# Patient Record
Sex: FEMALE | Race: WHITE | NOT HISPANIC OR LATINO | Employment: UNEMPLOYED | URBAN - METROPOLITAN AREA
[De-identification: names, ages, dates, MRNs, and addresses within clinical notes are randomized per-mention and may not be internally consistent; named-entity substitution may affect disease eponyms.]

---

## 2017-03-08 ENCOUNTER — HOSPITAL ENCOUNTER (EMERGENCY)
Facility: HOSPITAL | Age: 32
Discharge: HOME | End: 2017-03-08
Attending: EMERGENCY MEDICINE

## 2017-03-08 VITALS
TEMPERATURE: 97 F | HEART RATE: 69 BPM | HEIGHT: 66 IN | SYSTOLIC BLOOD PRESSURE: 132 MMHG | OXYGEN SATURATION: 97 % | BODY MASS INDEX: 36.96 KG/M2 | RESPIRATION RATE: 16 BRPM | WEIGHT: 230 LBS | DIASTOLIC BLOOD PRESSURE: 64 MMHG

## 2017-03-08 DIAGNOSIS — R10.11 RUQ ABDOMINAL PAIN: Primary | ICD-10-CM

## 2017-03-08 LAB
ALBUMIN SERPL BCP-MCNC: 3.3 G/DL
ALP SERPL-CCNC: 55 U/L
ALT SERPL W/O P-5'-P-CCNC: 13 U/L
ANION GAP SERPL CALC-SCNC: 6 MMOL/L
AST SERPL-CCNC: 9 U/L
B-HCG UR QL: NEGATIVE
BACTERIA #/AREA URNS HPF: NORMAL /HPF
BASOPHILS # BLD AUTO: 0.1 K/UL
BASOPHILS NFR BLD: 0.6 %
BILIRUB SERPL-MCNC: 0.1 MG/DL
BILIRUB UR QL STRIP: ABNORMAL
BUN SERPL-MCNC: 10 MG/DL
CALCIUM SERPL-MCNC: 9.1 MG/DL
CHLORIDE SERPL-SCNC: 105 MMOL/L
CLARITY UR: CLEAR
CO2 SERPL-SCNC: 28 MMOL/L
COLOR UR: YELLOW
CREAT SERPL-MCNC: 0.8 MG/DL
CTP QC/QA: YES
DIFFERENTIAL METHOD: NORMAL
EOSINOPHIL # BLD AUTO: 0.2 K/UL
EOSINOPHIL NFR BLD: 1.8 %
ERYTHROCYTE [DISTWIDTH] IN BLOOD BY AUTOMATED COUNT: 13.7 %
EST. GFR  (AFRICAN AMERICAN): >60 ML/MIN/1.73 M^2
EST. GFR  (NON AFRICAN AMERICAN): >60 ML/MIN/1.73 M^2
GLUCOSE SERPL-MCNC: 100 MG/DL
GLUCOSE UR QL STRIP: NEGATIVE
HCT VFR BLD AUTO: 38.5 %
HGB BLD-MCNC: 12.7 G/DL
HGB UR QL STRIP: ABNORMAL
KETONES UR QL STRIP: ABNORMAL
LEUKOCYTE ESTERASE UR QL STRIP: NEGATIVE
LIPASE SERPL-CCNC: 15 U/L
LYMPHOCYTES # BLD AUTO: 4.2 K/UL
LYMPHOCYTES NFR BLD: 33 %
MCH RBC QN AUTO: 29.6 PG
MCHC RBC AUTO-ENTMCNC: 33 %
MCV RBC AUTO: 90 FL
MICROSCOPIC COMMENT: NORMAL
MONOCYTES # BLD AUTO: 0.9 K/UL
MONOCYTES NFR BLD: 6.7 %
NEUTROPHILS # BLD AUTO: 7.4 K/UL
NEUTROPHILS NFR BLD: 57.9 %
NITRITE UR QL STRIP: NEGATIVE
PH UR STRIP: 6 [PH] (ref 5–8)
PLATELET # BLD AUTO: 260 K/UL
PMV BLD AUTO: 10.6 FL
POTASSIUM SERPL-SCNC: 4.4 MMOL/L
PROT SERPL-MCNC: 6.8 G/DL
PROT UR QL STRIP: NEGATIVE
RBC # BLD AUTO: 4.28 M/UL
RBC #/AREA URNS HPF: 3 /HPF (ref 0–4)
SODIUM SERPL-SCNC: 139 MMOL/L
SP GR UR STRIP: 1.02 (ref 1–1.03)
SQUAMOUS #/AREA URNS HPF: 7 /HPF
URN SPEC COLLECT METH UR: ABNORMAL
UROBILINOGEN UR STRIP-ACNC: NEGATIVE EU/DL
WBC # BLD AUTO: 12.7 K/UL
WBC #/AREA URNS HPF: 1 /HPF (ref 0–5)

## 2017-03-08 PROCEDURE — 85025 COMPLETE CBC W/AUTO DIFF WBC: CPT

## 2017-03-08 PROCEDURE — 63600175 PHARM REV CODE 636 W HCPCS: Performed by: EMERGENCY MEDICINE

## 2017-03-08 PROCEDURE — 81000 URINALYSIS NONAUTO W/SCOPE: CPT

## 2017-03-08 PROCEDURE — 96376 TX/PRO/DX INJ SAME DRUG ADON: CPT

## 2017-03-08 PROCEDURE — 80053 COMPREHEN METABOLIC PANEL: CPT

## 2017-03-08 PROCEDURE — 81025 URINE PREGNANCY TEST: CPT | Performed by: EMERGENCY MEDICINE

## 2017-03-08 PROCEDURE — 83690 ASSAY OF LIPASE: CPT

## 2017-03-08 PROCEDURE — 99284 EMERGENCY DEPT VISIT MOD MDM: CPT | Mod: 25

## 2017-03-08 PROCEDURE — 25000003 PHARM REV CODE 250: Performed by: EMERGENCY MEDICINE

## 2017-03-08 PROCEDURE — 96374 THER/PROPH/DIAG INJ IV PUSH: CPT

## 2017-03-08 PROCEDURE — 36415 COLL VENOUS BLD VENIPUNCTURE: CPT

## 2017-03-08 PROCEDURE — 96375 TX/PRO/DX INJ NEW DRUG ADDON: CPT

## 2017-03-08 PROCEDURE — 96361 HYDRATE IV INFUSION ADD-ON: CPT

## 2017-03-08 RX ORDER — HYDROCODONE BITARTRATE AND ACETAMINOPHEN 5; 325 MG/1; MG/1
1 TABLET ORAL EVERY 6 HOURS PRN
Qty: 18 TABLET | Refills: 0 | Status: SHIPPED | OUTPATIENT
Start: 2017-03-08 | End: 2020-07-16 | Stop reason: ALTCHOICE

## 2017-03-08 RX ORDER — ONDANSETRON 4 MG/1
4 TABLET, ORALLY DISINTEGRATING ORAL EVERY 8 HOURS PRN
Qty: 12 TABLET | Refills: 0 | Status: SHIPPED | OUTPATIENT
Start: 2017-03-08 | End: 2020-07-16 | Stop reason: ALTCHOICE

## 2017-03-08 RX ORDER — ONDANSETRON 2 MG/ML
4 INJECTION INTRAMUSCULAR; INTRAVENOUS
Status: COMPLETED | OUTPATIENT
Start: 2017-03-08 | End: 2017-03-08

## 2017-03-08 RX ORDER — ESCITALOPRAM OXALATE 20 MG/1
20 TABLET ORAL DAILY
COMMUNITY
End: 2020-07-16 | Stop reason: ALTCHOICE

## 2017-03-08 RX ORDER — HYDROCODONE BITARTRATE AND ACETAMINOPHEN 5; 325 MG/1; MG/1
1 TABLET ORAL
Status: COMPLETED | OUTPATIENT
Start: 2017-03-08 | End: 2017-03-08

## 2017-03-08 RX ORDER — NORGESTIMATE AND ETHINYL ESTRADIOL 7DAYSX3 LO
1 KIT ORAL DAILY
COMMUNITY
End: 2020-07-16 | Stop reason: ALTCHOICE

## 2017-03-08 RX ORDER — MORPHINE SULFATE 4 MG/ML
4 INJECTION, SOLUTION INTRAMUSCULAR; INTRAVENOUS
Status: COMPLETED | OUTPATIENT
Start: 2017-03-08 | End: 2017-03-08

## 2017-03-08 RX ADMIN — ONDANSETRON 4 MG: 2 INJECTION INTRAMUSCULAR; INTRAVENOUS at 08:03

## 2017-03-08 RX ADMIN — MORPHINE SULFATE 4 MG: 4 INJECTION INTRAVENOUS at 08:03

## 2017-03-08 RX ADMIN — HYDROCODONE BITARTRATE AND ACETAMINOPHEN 1 TABLET: 5; 325 TABLET ORAL at 10:03

## 2017-03-08 RX ADMIN — SODIUM CHLORIDE 1000 ML: 0.9 INJECTION, SOLUTION INTRAVENOUS at 08:03

## 2017-03-08 NOTE — ED AVS SNAPSHOT
[facility] [address]  [place] [address]               Cyndy Mario   3/8/2017  7:41 PM   ED    Description:  Female : 1985   Department:  [facility]           Your Care was Coordinated By:     Provider Role From To    Rusty Barajas MD Attending Provider 17 1942 --      Reason for Visit     Abdominal Pain           Diagnoses this Visit        Comments    RUQ abdominal pain    -  Primary       ED Disposition     None           To Do List           Follow-up Information     Follow up with Corby Suero MD. Schedule an appointment as soon as possible for a visit in 2 weeks.    Specialties:  General Surgery, Surgery    Why:  return to the ED, As needed, If symptoms worsen    Contact information:    [address] [facility]  [address]  [facility] [address]  [phone]         These Medications        Disp Refills Start End    hydrocodone-acetaminophen 5-325mg (NORCO) 5-325 mg per tablet 18 tablet 0 3/8/2017     Take 1 tablet by mouth every 6 (six) hours as needed for Pain. - Oral    ondansetron (ZOFRAN-ODT) 4 MG TbDL 12 tablet 0 3/8/2017     Take 1 tablet (4 mg total) by mouth every 8 (eight) hours as needed (nausea/vomiting). - Oral      [facility]     [facility] -  Assistance  Registered nurses in the [facility] provide clinical advisement, health education, appointment booking, and other advisory services.  Call for this free service at [phone].             Medications           Message regarding Medications     Verify the changes and/or additions to your medication regime listed below are the same as discussed with your clinician today.  If any of these changes or additions are incorrect, please notify your healthcare provider.        START taking these NEW medications        Refills    hydrocodone-acetaminophen 5-325mg (NORCO) 5-325 mg per tablet 0    Sig: Take 1 tablet by mouth  "every 6 (six) hours as needed for Pain.    Class: Print    Route: Oral    ondansetron (ZOFRAN-ODT) 4 MG TbDL 0    Sig: Take 1 tablet (4 mg total) by mouth every 8 (eight) hours as needed (nausea/vomiting).    Class: Print    Route: Oral      These medications were administered today        Dose Freq    sodium chloride 0.9% bolus 1,000 mL 1,000 mL Once    Sig: Inject 1,000 mLs into the vein once.    Class: Normal    Route: Intravenous    ondansetron injection 4 mg 4 mg ED 1 Time    Sig: Inject 4 mg into the vein ED 1 Time.    Class: Normal    Route: Intravenous    morphine injection 4 mg 4 mg ED 1 Time    Sig: Inject 1 mL (4 mg total) into the vein ED 1 Time.    Class: Normal    Route: Intravenous    ondansetron injection 4 mg 4 mg ED 1 Time    Sig: Inject 4 mg into the vein ED 1 Time.    Class: Normal    Route: Intravenous           Verify that the below list of medications is an accurate representation of the medications you are currently taking.  If none reported, the list may be blank. If incorrect, please contact your healthcare provider. Carry this list with you in case of emergency.           Current Medications     escitalopram oxalate (LEXAPRO) 20 MG tablet Take 20 mg by mouth once daily.    norgestimate-ethinyl estradiol (ORTHO TRI-CYCLEN LO) 0.18/0.215/0.25 mg-25 mcg tablet Take 1 tablet by mouth once daily.    hydrocodone-acetaminophen 5-325mg (NORCO) 5-325 mg per tablet Take 1 tablet by mouth every 6 (six) hours as needed for Pain.    ondansetron (ZOFRAN-ODT) 4 MG TbDL Take 1 tablet (4 mg total) by mouth every 8 (eight) hours as needed (nausea/vomiting).           Clinical Reference Information           Your Vitals Were     BP Pulse Temp Resp Height Weight    112/77 (BP Location: Right arm, Patient Position: Sitting) 63 97.7 °F (36.5 °C) (Oral) 16 5' 6" (1.676 m) 104.3 kg (230 lb)    Last Period SpO2 BMI          03/08/2017 96% 37.12 kg/m2        Allergies as of 3/8/2017     No Known Allergies    "   Immunizations Administered on Date of Encounter - 3/8/2017     None      ED Micro, Lab, POCT     Start Ordered       Status Ordering Provider    03/08/17 1949 03/08/17 1949  CBC W/ AUTO DIFFERENTIAL  Once      Final result     03/08/17 1949 03/08/17 1949  Comp. Metabolic Panel  STAT      Final result     03/08/17 1949 03/08/17 1949  Lipase  STAT      Final result     03/08/17 1949 03/08/17 1949    STAT,   Status:  Canceled      Canceled     03/08/17 1942 03/08/17 1941  Urinalysis Clean Catch  STAT      Final result     03/08/17 1942 03/08/17 1941  POCT urine pregnancy  Once      Final result     03/08/17 1941 03/08/17 1941  Urinalysis Microscopic  Once      Final result       ED Imaging Orders     Start Ordered       Status Ordering Provider    03/08/17 1949 03/08/17 1949  US Abdomen Limited  1 time imaging      In process       [facility] Sign-Up     Activating your [facility] account is as easy as 1-2-3!     1) Visit [email or web address], select Sign Up Now, enter this activation code and your date of birth, then select Next.  [email or web address]  Expires: 4/22/2017  9:55 PM      2) Create a username and password to use when you visit [facility] in the future and select a security question in case you lose your password and select Next.    3) Enter your e-mail address and click Sign Up!    Additional Information  If you have questions, please e-mail [email or web address] or call [phone] to talk to our [facility] staff. Remember, [facility] is NOT to be used for urgent needs. For medical emergencies, dial 911.          [facility] complies with applicable Federal civil rights laws and does not discriminate on the basis of race, color, national origin, age, disability, or sex.        Language Assistance Services     ATTENTION: Language assistance services are available, free of charge. Please call [phone].      ATENCIÓN: Si habla español, tiene a sorenson disposición servicios gratuitos de asistencia  lingüística. [facility] [phone].     RADHA Ý: N?u b?n nói Ti?ng Vi?t, có các d?ch v? h? tr? ngôn ng? mi?n phí dành cho b?n. G?i s? [phone].

## 2017-03-09 NOTE — ED PROVIDER NOTES
Encounter Date: 3/8/2017       History     Chief Complaint   Patient presents with    Abdominal Pain     RUQ since yesterday with nausea and 1 episode of vomiting     Review of patient's allergies indicates:  No Known Allergies  HPI Comments: 31-year-old female with a past medical history of anxiety presents with a chief complaint of abdominal pain.  She reports the pain started around 12 noon yesterday, and has been persistent since that time.  She reports it is getting progressively worse.  She reports associated nausea and one episode of vomiting.  She reports the pain is unrelieved with over-the-counter Tylenol.  She denies any associated diarrhea, fever/chills, cough, shortness of breath, dysuria, hematuria, or hematochezia.    The history is provided by the patient.     Past Medical History:   Diagnosis Date    Anxiety      Past Surgical History:   Procedure Laterality Date    APPENDECTOMY       SECTION, LOW TRANSVERSE      x 2    EYE SURGERY Right     TUBAL LIGATION       History reviewed. No pertinent family history.  Social History   Substance Use Topics    Smoking status: Current Every Day Smoker     Packs/day: 1.00     Types: Cigarettes    Smokeless tobacco: None    Alcohol use Yes      Comment: occ     Review of Systems   Constitutional: Negative for chills and fever.   HENT: Negative for congestion.    Respiratory: Negative for cough and shortness of breath.    Cardiovascular: Negative for chest pain.   Gastrointestinal: Positive for abdominal pain, nausea and vomiting.   Genitourinary: Negative for dysuria.   Musculoskeletal: Negative for gait problem.   Skin: Negative for color change.   Neurological: Negative for dizziness and numbness.   Psychiatric/Behavioral: Negative for agitation.       Physical Exam   Initial Vitals   BP Pulse Resp Temp SpO2   17 1936 17 19317   112/77 63 16 97.7 °F (36.5 °C) 96 %     Physical Exam    Nursing  note and vitals reviewed.  Constitutional: She appears well-developed and well-nourished.   Anxious appearing   HENT:   Head: Atraumatic.   Eyes: EOM are normal. Pupils are equal, round, and reactive to light.   Neck: Normal range of motion.   Cardiovascular: Normal rate and regular rhythm.   Pulmonary/Chest: Breath sounds normal.   Abdominal: Soft. Bowel sounds are normal. She exhibits no distension and no mass. There is tenderness. There is no rebound and no guarding.   Right upper quadrant tenderness to palpation.  No rebound or guarding noted.   Musculoskeletal: Normal range of motion.        Right shoulder: Normal.        Left shoulder: Normal.   Neurological: She is alert and oriented to person, place, and time.   Skin: Skin is warm and dry.   Psychiatric: She has a normal mood and affect.         ED Course   Procedures  Labs Reviewed   URINALYSIS   CBC W/ AUTO DIFFERENTIAL   COMPREHENSIVE METABOLIC PANEL   LIPASE   POCT URINE PREGNANCY             Medical Decision Making:   Initial Assessment:   31-year-old female presented with a chief complaint of abdominal pain.  Differential Diagnosis:   Initial differential diagnosis included but is not limited to cholecystitis, cholelithiasis, gastritis, and pancreatitis.  Clinical Tests:   Lab Tests: Reviewed and Ordered  Radiological Study: Ordered and Reviewed  ED Management:  The patient was emergently evaluated in the ED, her evaluation was significant for young female with right upper quadrant tenderness to palpation.  The patient's labs showed no acute processes.  The patient's ultrasound showed gallbladder sludge, without evidence of an acute cholecystitis.  After the patient's ultrasound she received IV morphine for pain, with significant improvement in it.  The patient is stable for discharge to home.  She is tolerating by mouth in the ED.  She will be discharged home with by mouth pain medication and ODT Zofran.  She is referred to general surgery for  follow-up of likely early gallbladder disease.                   ED Course     Clinical Impression:   The encounter diagnosis was RUQ abdominal pain.          Rusty Barajas MD  03/09/17 7618

## 2020-07-16 ENCOUNTER — HOSPITAL ENCOUNTER (EMERGENCY)
Facility: HOSPITAL | Age: 35
Discharge: HOME | End: 2020-07-16
Attending: EMERGENCY MEDICINE

## 2020-07-16 VITALS
DIASTOLIC BLOOD PRESSURE: 58 MMHG | OXYGEN SATURATION: 98 % | WEIGHT: 230 LBS | RESPIRATION RATE: 18 BRPM | SYSTOLIC BLOOD PRESSURE: 102 MMHG | BODY MASS INDEX: 36.96 KG/M2 | HEIGHT: 66 IN | HEART RATE: 69 BPM | TEMPERATURE: 98 F

## 2020-07-16 DIAGNOSIS — K80.20 CALCULUS OF GALLBLADDER WITHOUT CHOLECYSTITIS WITHOUT OBSTRUCTION: Primary | ICD-10-CM

## 2020-07-16 LAB
ALBUMIN SERPL BCP-MCNC: 3.7 G/DL (ref 3.5–5.2)
ALP SERPL-CCNC: 71 U/L (ref 55–135)
ALT SERPL W/O P-5'-P-CCNC: 13 U/L (ref 10–44)
ANION GAP SERPL CALC-SCNC: 7 MMOL/L (ref 8–16)
AST SERPL-CCNC: 13 U/L (ref 10–40)
BASOPHILS # BLD AUTO: 0.05 K/UL (ref 0–0.2)
BASOPHILS NFR BLD: 0.6 % (ref 0–1.9)
BILIRUB SERPL-MCNC: 0.2 MG/DL (ref 0.1–1)
BUN SERPL-MCNC: 5 MG/DL (ref 6–20)
CALCIUM SERPL-MCNC: 9.2 MG/DL (ref 8.7–10.5)
CHLORIDE SERPL-SCNC: 104 MMOL/L (ref 95–110)
CO2 SERPL-SCNC: 25 MMOL/L (ref 23–29)
CREAT SERPL-MCNC: 0.7 MG/DL (ref 0.5–1.4)
DIFFERENTIAL METHOD: NORMAL
EOSINOPHIL # BLD AUTO: 0.1 K/UL (ref 0–0.5)
EOSINOPHIL NFR BLD: 1 % (ref 0–8)
ERYTHROCYTE [DISTWIDTH] IN BLOOD BY AUTOMATED COUNT: 14 % (ref 11.5–14.5)
EST. GFR  (AFRICAN AMERICAN): >60 ML/MIN/1.73 M^2
EST. GFR  (NON AFRICAN AMERICAN): >60 ML/MIN/1.73 M^2
GLUCOSE SERPL-MCNC: 96 MG/DL (ref 70–110)
HCT VFR BLD AUTO: 39.2 % (ref 37–48.5)
HGB BLD-MCNC: 12.6 G/DL (ref 12–16)
IMM GRANULOCYTES # BLD AUTO: 0.03 K/UL (ref 0–0.04)
IMM GRANULOCYTES NFR BLD AUTO: 0.3 % (ref 0–0.5)
LIPASE SERPL-CCNC: 10 U/L (ref 4–60)
LYMPHOCYTES # BLD AUTO: 1.7 K/UL (ref 1–4.8)
LYMPHOCYTES NFR BLD: 20.1 % (ref 18–48)
MCH RBC QN AUTO: 28.6 PG (ref 27–31)
MCHC RBC AUTO-ENTMCNC: 32.1 G/DL (ref 32–36)
MCV RBC AUTO: 89 FL (ref 82–98)
MONOCYTES # BLD AUTO: 0.8 K/UL (ref 0.3–1)
MONOCYTES NFR BLD: 9.1 % (ref 4–15)
NEUTROPHILS # BLD AUTO: 6 K/UL (ref 1.8–7.7)
NEUTROPHILS NFR BLD: 68.9 % (ref 38–73)
NRBC BLD-RTO: 0 /100 WBC
PLATELET # BLD AUTO: 295 K/UL (ref 150–350)
PMV BLD AUTO: 11.7 FL (ref 9.2–12.9)
POTASSIUM SERPL-SCNC: 4.2 MMOL/L (ref 3.5–5.1)
PROT SERPL-MCNC: 8 G/DL (ref 6–8.4)
RBC # BLD AUTO: 4.4 M/UL (ref 4–5.4)
SODIUM SERPL-SCNC: 136 MMOL/L (ref 136–145)
WBC # BLD AUTO: 8.65 K/UL (ref 3.9–12.7)

## 2020-07-16 PROCEDURE — 63600175 PHARM REV CODE 636 W HCPCS: Performed by: EMERGENCY MEDICINE

## 2020-07-16 PROCEDURE — 99284 EMERGENCY DEPT VISIT MOD MDM: CPT | Mod: 25

## 2020-07-16 PROCEDURE — 85025 COMPLETE CBC W/AUTO DIFF WBC: CPT

## 2020-07-16 PROCEDURE — 96361 HYDRATE IV INFUSION ADD-ON: CPT

## 2020-07-16 PROCEDURE — 83690 ASSAY OF LIPASE: CPT

## 2020-07-16 PROCEDURE — 36415 COLL VENOUS BLD VENIPUNCTURE: CPT

## 2020-07-16 PROCEDURE — 96374 THER/PROPH/DIAG INJ IV PUSH: CPT

## 2020-07-16 PROCEDURE — 80053 COMPREHEN METABOLIC PANEL: CPT

## 2020-07-16 PROCEDURE — 96375 TX/PRO/DX INJ NEW DRUG ADDON: CPT

## 2020-07-16 PROCEDURE — 25000003 PHARM REV CODE 250: Performed by: EMERGENCY MEDICINE

## 2020-07-16 RX ORDER — ONDANSETRON 2 MG/ML
4 INJECTION INTRAMUSCULAR; INTRAVENOUS
Status: COMPLETED | OUTPATIENT
Start: 2020-07-16 | End: 2020-07-16

## 2020-07-16 RX ORDER — HYDROCODONE BITARTRATE AND ACETAMINOPHEN 5; 325 MG/1; MG/1
1 TABLET ORAL EVERY 6 HOURS PRN
Qty: 8 TABLET | Refills: 0 | Status: SHIPPED | OUTPATIENT
Start: 2020-07-16

## 2020-07-16 RX ORDER — MORPHINE SULFATE 8 MG/ML
8 INJECTION INTRAMUSCULAR; INTRAVENOUS; SUBCUTANEOUS
Status: COMPLETED | OUTPATIENT
Start: 2020-07-16 | End: 2020-07-16

## 2020-07-16 RX ORDER — SIMETHICONE 125 MG
125 TABLET,CHEWABLE ORAL EVERY 6 HOURS PRN
COMMUNITY

## 2020-07-16 RX ORDER — OMEPRAZOLE 20 MG/1
20 CAPSULE, DELAYED RELEASE ORAL DAILY
COMMUNITY

## 2020-07-16 RX ORDER — ONDANSETRON 4 MG/1
4 TABLET, ORALLY DISINTEGRATING ORAL EVERY 8 HOURS PRN
Qty: 12 TABLET | Refills: 0 | Status: SHIPPED | OUTPATIENT
Start: 2020-07-16

## 2020-07-16 RX ADMIN — MORPHINE SULFATE 4 MG: 8 INJECTION, SOLUTION INTRAMUSCULAR; INTRAVENOUS at 08:07

## 2020-07-16 RX ADMIN — ONDANSETRON 4 MG: 2 INJECTION INTRAMUSCULAR; INTRAVENOUS at 08:07

## 2020-07-16 RX ADMIN — SODIUM CHLORIDE 1000 ML: 0.9 INJECTION, SOLUTION INTRAVENOUS at 08:07

## 2020-07-16 NOTE — DISCHARGE INSTRUCTIONS
As we discussed, return to the emergency department for new or worsening symptoms including worsening pain, fever, or inability to eat or drink.    [facility] - [phone] - If needed for general surgery follow up

## 2020-07-16 NOTE — ED TRIAGE NOTES
"Pt to er c/o RUQ pain that radiates to her back--reports 3 episodes in last week--N/V present--describes as  tight and "grabbing" sensation  "

## 2020-07-16 NOTE — ED PROVIDER NOTES
"Chief complaint:  Abdominal Pain (right side, started 0345 with indegestion and pain at 0530.  Pt states, "I have had 3 of these gallbladder attacks in the past week."), Nausea, and Emesis      HPI:  Cyndy Mario is a 34 y.o. female presenting with acute onset of right upper quadrant pain starting several hours prior to presentation, constant, aching.  She has a history of chronic postprandial pain in this region with prior gallbladder evaluation revealing sludge.  She has had separate dyspepsia improved with Prilosec in the past different from this.  She denies significant radiation or migration.  She did not improve with antacid.  She denies fever.  She has had several episodes of nonbilious, nonbloody emesis.  She denies change in bowel movements or blood in the stools.  She has had prior abdominal surgery including appendectomy.    ROS: As per HPI and below:  No dysuria, hematuria, vaginal discharge or pain, chest pain, dyspnea, hematemesis, fever, rashes, swelling, headache. The patient/family denies blurry vision, dysphagia, joint swelling, easy bruising.    Review of patient's allergies indicates:  No Known Allergies    Discharge Medication List as of 7/16/2020 10:27 AM      START taking these medications    Details   HYDROcodone-acetaminophen (NORCO) 5-325 mg per tablet Take 1 tablet by mouth every 6 (six) hours as needed for Pain., Starting Thu 7/16/2020, Print      ondansetron (ZOFRAN-ODT) 4 MG TbDL Take 1 tablet (4 mg total) by mouth every 8 (eight) hours as needed., Starting u 7/16/2020, Print         CONTINUE these medications which have NOT CHANGED    Details   omeprazole (PRILOSEC) 20 MG capsule Take 20 mg by mouth once daily., Historical Med      simethicone (MYLICON) 125 MG chewable tablet Take 125 mg by mouth every 6 (six) hours as needed for Flatulence., Historical Med             PMH:  As per HPI and below:  Past Medical History:   Diagnosis Date    Anxiety     GERD (gastroesophageal " reflux disease)      Past Surgical History:   Procedure Laterality Date    APPENDECTOMY       SECTION, LOW TRANSVERSE      x 2    EYE SURGERY Right     TUBAL LIGATION         Social History     Socioeconomic History    Marital status:      Spouse name: Not on file    Number of children: Not on file    Years of education: Not on file    Highest education level: Not on file   Occupational History    Not on file   Social Needs    Financial resource strain: Not on file    Food insecurity     Worry: Not on file     Inability: Not on file    Transportation needs     Medical: Not on file     Non-medical: Not on file   Tobacco Use    Smoking status: Current Every Day Smoker     Packs/day: 1.00     Types: Cigarettes   Substance and Sexual Activity    Alcohol use: Yes     Comment: occ    Drug use: No    Sexual activity: Not on file   Lifestyle    Physical activity     Days per week: Not on file     Minutes per session: Not on file    Stress: Not on file   Relationships    Social connections     Talks on phone: Not on file     Gets together: Not on file     Attends Sikhism service: Not on file     Active member of club or organization: Not on file     Attends meetings of clubs or organizations: Not on file     Relationship status: Not on file   Other Topics Concern    Not on file   Social History Narrative    Not on file       History reviewed. No pertinent family history.    Physical Exam:    Vitals:    20 0931   BP: (!) 102/58   Pulse: 69   Resp:    Temp:      GENERAL:  No apparent distress.  Alert.  Large body habitus.  HEENT:  Moist mucous membranes.  Normocephalic and atraumatic.  No scleral icterus is present.  NECK:  No swelling.  Midline trachea.   CARDIOVASCULAR:  Regular rate and rhythm.  2+ radial pulses.    PULMONARY:  Lungs clear to auscultation bilaterally.  No wheezes, rales, or rhonci.    ABDOMEN:  Epigastric and right upper quadrant tenderness with mild voluntary  guarding.  No involuntary guarding, distention, masses, hernias.  Negative Parker sign.  EXTREMITIES:  Warm and well perfused.  Brisk capillary refill.  No peripheral edema.  NEUROLOGICAL:  Normal mental status.  Appropriate and conversant.    SKIN:  No rashes or ecchymoses.    BACK:  Atraumatic.  No CVA tenderness to palpation.      Labs Reviewed   COMPREHENSIVE METABOLIC PANEL - Abnormal; Notable for the following components:       Result Value    BUN, Bld 5 (*)     Anion Gap 7 (*)     All other components within normal limits   CBC W/ AUTO DIFFERENTIAL   LIPASE   POCT URINE PREGNANCY       Discharge Medication List as of 7/16/2020 10:27 AM      START taking these medications    Details   HYDROcodone-acetaminophen (NORCO) 5-325 mg per tablet Take 1 tablet by mouth every 6 (six) hours as needed for Pain., Starting Thu 7/16/2020, Print      ondansetron (ZOFRAN-ODT) 4 MG TbDL Take 1 tablet (4 mg total) by mouth every 8 (eight) hours as needed., Starting u 7/16/2020, Print         CONTINUE these medications which have NOT CHANGED    Details   omeprazole (PRILOSEC) 20 MG capsule Take 20 mg by mouth once daily., Historical Med      simethicone (MYLICON) 125 MG chewable tablet Take 125 mg by mouth every 6 (six) hours as needed for Flatulence., Historical Med             Orders Placed This Encounter   Procedures    US Abdomen Limited    CBC auto differential    Comprehensive metabolic panel    Lipase    POCT urine pregnancy    Insert peripheral IV       Imaging Results          US Abdomen Limited (Final result)  Result time 07/16/20 10:02:54    Final result by Yovanny Cabello MD (07/16/20 10:02:54)                 Impression:      Cholelithiasis      Electronically signed by: Yovanny Cabello MD  Date:    07/16/2020  Time:    10:02             Narrative:    EXAMINATION:  US ABDOMEN LIMITED    CLINICAL HISTORY:  Abdominal pain, r/o cholecystitis;    TECHNIQUE:  Limited ultrasound of the right upper quadrant of the  abdomen (including pancreas, liver, gallbladder, common bile duct, and right kidney) was performed.    COMPARISON:  None.    FINDINGS:  A 1.7 cm gallstone is seen in the region the gallbladder neck.  There is no evidence of gallbladder wall thickening or biliary ductal dilatation with the common bile duct measuring 3 mm.  The right lobe of the liver measures 14 cm.  No definite focal hepatic mass lesions are seen.  The right kidney measures 11.9 cm and there is no evidence of hydronephrosis.                              (rad read)       MDM:    34 y.o. female with recurrent right upper quadrant pain consistent with cholelithiasis.  I doubt cholecystitis do not think emergent surgical intervention is indicated.  She has had prior appendectomy and I doubt appendicitis based on location of pain.  She may also have other upper GI etiology such as gastritis but clear gallstones are seen with history consistent with biliary colic.  I doubt choledocholithiasis.  Laboratories including LFTs reviewed.  Lipase is normal I doubt pancreatitis.  She improves here with analgesia and antiemetic.  Follow up with General surgery.  Dietary restrictions as well as general return precautions reviewed in detail.    Diagnoses:    1.  Cholelithiasis     Colin Carr MD  07/16/20 9485

## 2020-10-06 ENCOUNTER — HOSPITAL ENCOUNTER (OUTPATIENT)
Facility: HOSPITAL | Age: 35
Discharge: HOME | End: 2020-10-07
Attending: EMERGENCY MEDICINE | Admitting: INTERNAL MEDICINE
Payer: MEDICAID

## 2020-10-06 DIAGNOSIS — R10.11 RUQ ABDOMINAL PAIN: ICD-10-CM

## 2020-10-06 DIAGNOSIS — K80.20 CALCULUS OF GALLBLADDER WITHOUT CHOLECYSTITIS WITHOUT OBSTRUCTION: ICD-10-CM

## 2020-10-06 DIAGNOSIS — R07.9 CHEST PAIN: ICD-10-CM

## 2020-10-06 DIAGNOSIS — Z90.49 S/P LAPAROSCOPIC CHOLECYSTECTOMY: Primary | Chronic | ICD-10-CM

## 2020-10-06 DIAGNOSIS — K80.20 CHOLELITHIASIS: ICD-10-CM

## 2020-10-06 PROBLEM — E66.9 CLASS 2 OBESITY IN ADULT: Chronic | Status: ACTIVE | Noted: 2020-10-06

## 2020-10-06 PROBLEM — Z72.0 NICOTINE ABUSE: Chronic | Status: ACTIVE | Noted: 2020-10-06

## 2020-10-06 LAB
ALBUMIN SERPL BCP-MCNC: 3.6 G/DL (ref 3.5–5.2)
ALP SERPL-CCNC: 53 U/L (ref 55–135)
ALT SERPL W/O P-5'-P-CCNC: 15 U/L (ref 10–44)
ANION GAP SERPL CALC-SCNC: 7 MMOL/L (ref 8–16)
AST SERPL-CCNC: 14 U/L (ref 10–40)
BASOPHILS # BLD AUTO: 0.07 K/UL (ref 0–0.2)
BASOPHILS NFR BLD: 0.6 % (ref 0–1.9)
BILIRUB SERPL-MCNC: 0.3 MG/DL (ref 0.1–1)
BILIRUB UR QL STRIP: NEGATIVE
BUN SERPL-MCNC: 10 MG/DL (ref 6–20)
CALCIUM SERPL-MCNC: 8.8 MG/DL (ref 8.7–10.5)
CHLORIDE SERPL-SCNC: 104 MMOL/L (ref 95–110)
CLARITY UR: CLEAR
CO2 SERPL-SCNC: 25 MMOL/L (ref 23–29)
COLOR UR: YELLOW
CREAT SERPL-MCNC: 0.6 MG/DL (ref 0.5–1.4)
DIFFERENTIAL METHOD: ABNORMAL
EOSINOPHIL # BLD AUTO: 0.2 K/UL (ref 0–0.5)
EOSINOPHIL NFR BLD: 2.1 % (ref 0–8)
ERYTHROCYTE [DISTWIDTH] IN BLOOD BY AUTOMATED COUNT: 15.4 % (ref 11.5–14.5)
EST. GFR  (AFRICAN AMERICAN): >60 ML/MIN/1.73 M^2
EST. GFR  (NON AFRICAN AMERICAN): >60 ML/MIN/1.73 M^2
GLUCOSE SERPL-MCNC: 97 MG/DL (ref 70–110)
GLUCOSE UR QL STRIP: NEGATIVE
HCT VFR BLD AUTO: 37.3 % (ref 37–48.5)
HGB BLD-MCNC: 11.9 G/DL (ref 12–16)
HGB UR QL STRIP: NEGATIVE
IMM GRANULOCYTES # BLD AUTO: 0.03 K/UL (ref 0–0.04)
IMM GRANULOCYTES NFR BLD AUTO: 0.3 % (ref 0–0.5)
KETONES UR QL STRIP: NEGATIVE
LEUKOCYTE ESTERASE UR QL STRIP: NEGATIVE
LIPASE SERPL-CCNC: 27 U/L (ref 4–60)
LYMPHOCYTES # BLD AUTO: 3.1 K/UL (ref 1–4.8)
LYMPHOCYTES NFR BLD: 27.5 % (ref 18–48)
MCH RBC QN AUTO: 27.8 PG (ref 27–31)
MCHC RBC AUTO-ENTMCNC: 31.9 G/DL (ref 32–36)
MCV RBC AUTO: 87 FL (ref 82–98)
MONOCYTES # BLD AUTO: 1.2 K/UL (ref 0.3–1)
MONOCYTES NFR BLD: 10.3 % (ref 4–15)
NEUTROPHILS # BLD AUTO: 6.6 K/UL (ref 1.8–7.7)
NEUTROPHILS NFR BLD: 59.2 % (ref 38–73)
NITRITE UR QL STRIP: NEGATIVE
NRBC BLD-RTO: 0 /100 WBC
PH UR STRIP: 7 [PH] (ref 5–8)
PLATELET # BLD AUTO: 272 K/UL (ref 150–350)
PMV BLD AUTO: 12.2 FL (ref 9.2–12.9)
POTASSIUM SERPL-SCNC: 3.9 MMOL/L (ref 3.5–5.1)
PROT SERPL-MCNC: 6.9 G/DL (ref 6–8.4)
PROT UR QL STRIP: NEGATIVE
RBC # BLD AUTO: 4.28 M/UL (ref 4–5.4)
SARS-COV-2 RDRP RESP QL NAA+PROBE: NEGATIVE
SODIUM SERPL-SCNC: 136 MMOL/L (ref 136–145)
SP GR UR STRIP: 1.02 (ref 1–1.03)
URN SPEC COLLECT METH UR: ABNORMAL
UROBILINOGEN UR STRIP-ACNC: ABNORMAL EU/DL
WBC # BLD AUTO: 11.22 K/UL (ref 3.9–12.7)

## 2020-10-06 PROCEDURE — 81003 URINALYSIS AUTO W/O SCOPE: CPT

## 2020-10-06 PROCEDURE — G0378 HOSPITAL OBSERVATION PER HR: HCPCS

## 2020-10-06 PROCEDURE — 96375 TX/PRO/DX INJ NEW DRUG ADDON: CPT

## 2020-10-06 PROCEDURE — 25000003 PHARM REV CODE 250: Performed by: NURSE PRACTITIONER

## 2020-10-06 PROCEDURE — 96361 HYDRATE IV INFUSION ADD-ON: CPT

## 2020-10-06 PROCEDURE — U0002 COVID-19 LAB TEST NON-CDC: HCPCS

## 2020-10-06 PROCEDURE — 63600175 PHARM REV CODE 636 W HCPCS: Performed by: NURSE PRACTITIONER

## 2020-10-06 PROCEDURE — 63600175 PHARM REV CODE 636 W HCPCS: Performed by: SURGERY

## 2020-10-06 PROCEDURE — 80053 COMPREHEN METABOLIC PANEL: CPT

## 2020-10-06 PROCEDURE — 85025 COMPLETE CBC W/AUTO DIFF WBC: CPT

## 2020-10-06 PROCEDURE — 99285 EMERGENCY DEPT VISIT HI MDM: CPT | Mod: 25

## 2020-10-06 PROCEDURE — 83690 ASSAY OF LIPASE: CPT

## 2020-10-06 PROCEDURE — 63600175 PHARM REV CODE 636 W HCPCS: Performed by: INTERNAL MEDICINE

## 2020-10-06 PROCEDURE — 96365 THER/PROPH/DIAG IV INF INIT: CPT

## 2020-10-06 RX ORDER — POTASSIUM CHLORIDE 7.45 MG/ML
40 INJECTION INTRAVENOUS
Status: DISCONTINUED | OUTPATIENT
Start: 2020-10-06 | End: 2020-10-07 | Stop reason: HOSPADM

## 2020-10-06 RX ORDER — HYDROMORPHONE HYDROCHLORIDE 1 MG/ML
1 INJECTION, SOLUTION INTRAMUSCULAR; INTRAVENOUS; SUBCUTANEOUS EVERY 6 HOURS PRN
Status: DISCONTINUED | OUTPATIENT
Start: 2020-10-06 | End: 2020-10-07

## 2020-10-06 RX ORDER — SODIUM CHLORIDE, SODIUM LACTATE, POTASSIUM CHLORIDE, CALCIUM CHLORIDE 600; 310; 30; 20 MG/100ML; MG/100ML; MG/100ML; MG/100ML
INJECTION, SOLUTION INTRAVENOUS CONTINUOUS
Status: DISCONTINUED | OUTPATIENT
Start: 2020-10-06 | End: 2020-10-07 | Stop reason: HOSPADM

## 2020-10-06 RX ORDER — POTASSIUM CHLORIDE 20 MEQ/1
40 TABLET, EXTENDED RELEASE ORAL
Status: DISCONTINUED | OUTPATIENT
Start: 2020-10-06 | End: 2020-10-07 | Stop reason: HOSPADM

## 2020-10-06 RX ORDER — POTASSIUM CHLORIDE 20 MEQ/1
20 TABLET, EXTENDED RELEASE ORAL
Status: DISCONTINUED | OUTPATIENT
Start: 2020-10-06 | End: 2020-10-07 | Stop reason: HOSPADM

## 2020-10-06 RX ORDER — POTASSIUM CHLORIDE 7.45 MG/ML
20 INJECTION INTRAVENOUS
Status: DISCONTINUED | OUTPATIENT
Start: 2020-10-06 | End: 2020-10-07 | Stop reason: HOSPADM

## 2020-10-06 RX ORDER — TALC
6 POWDER (GRAM) TOPICAL NIGHTLY PRN
Status: DISCONTINUED | OUTPATIENT
Start: 2020-10-06 | End: 2020-10-07 | Stop reason: HOSPADM

## 2020-10-06 RX ORDER — LANOLIN ALCOHOL/MO/W.PET/CERES
800 CREAM (GRAM) TOPICAL
Status: DISCONTINUED | OUTPATIENT
Start: 2020-10-06 | End: 2020-10-07 | Stop reason: HOSPADM

## 2020-10-06 RX ORDER — ONDANSETRON 2 MG/ML
4 INJECTION INTRAMUSCULAR; INTRAVENOUS
Status: COMPLETED | OUTPATIENT
Start: 2020-10-06 | End: 2020-10-06

## 2020-10-06 RX ORDER — GLUCAGON 1 MG
1 KIT INJECTION
Status: DISCONTINUED | OUTPATIENT
Start: 2020-10-06 | End: 2020-10-07 | Stop reason: HOSPADM

## 2020-10-06 RX ORDER — MAGNESIUM SULFATE 1 G/100ML
1 INJECTION INTRAVENOUS
Status: DISCONTINUED | OUTPATIENT
Start: 2020-10-06 | End: 2020-10-07 | Stop reason: HOSPADM

## 2020-10-06 RX ORDER — HYDROMORPHONE HYDROCHLORIDE 1 MG/ML
1 INJECTION, SOLUTION INTRAMUSCULAR; INTRAVENOUS; SUBCUTANEOUS
Status: COMPLETED | OUTPATIENT
Start: 2020-10-06 | End: 2020-10-06

## 2020-10-06 RX ORDER — CALCIUM CHLORIDE IN 0.9 % NACL 1 G/100 ML
1 INTRAVENOUS SOLUTION, PIGGYBACK (ML) INTRAVENOUS
Status: DISCONTINUED | OUTPATIENT
Start: 2020-10-06 | End: 2020-10-07 | Stop reason: HOSPADM

## 2020-10-06 RX ORDER — HYDROCODONE BITARTRATE AND ACETAMINOPHEN 5; 325 MG/1; MG/1
1 TABLET ORAL EVERY 6 HOURS PRN
Status: DISCONTINUED | OUTPATIENT
Start: 2020-10-06 | End: 2020-10-07

## 2020-10-06 RX ORDER — SODIUM CHLORIDE 0.9 % (FLUSH) 0.9 %
10 SYRINGE (ML) INJECTION EVERY 6 HOURS PRN
Status: DISCONTINUED | OUTPATIENT
Start: 2020-10-06 | End: 2020-10-07

## 2020-10-06 RX ORDER — ONDANSETRON 4 MG/1
8 TABLET, FILM COATED ORAL 2 TIMES DAILY
COMMUNITY

## 2020-10-06 RX ORDER — MAGNESIUM SULFATE HEPTAHYDRATE 40 MG/ML
4 INJECTION, SOLUTION INTRAVENOUS
Status: DISCONTINUED | OUTPATIENT
Start: 2020-10-06 | End: 2020-10-07 | Stop reason: HOSPADM

## 2020-10-06 RX ORDER — IBUPROFEN 200 MG
400 TABLET ORAL EVERY 6 HOURS PRN
COMMUNITY

## 2020-10-06 RX ORDER — IBUPROFEN 200 MG
24 TABLET ORAL
Status: DISCONTINUED | OUTPATIENT
Start: 2020-10-06 | End: 2020-10-07 | Stop reason: HOSPADM

## 2020-10-06 RX ORDER — MAGNESIUM SULFATE HEPTAHYDRATE 40 MG/ML
2 INJECTION, SOLUTION INTRAVENOUS
Status: DISCONTINUED | OUTPATIENT
Start: 2020-10-06 | End: 2020-10-07 | Stop reason: HOSPADM

## 2020-10-06 RX ORDER — ONDANSETRON 2 MG/ML
4 INJECTION INTRAMUSCULAR; INTRAVENOUS EVERY 8 HOURS PRN
Status: DISCONTINUED | OUTPATIENT
Start: 2020-10-06 | End: 2020-10-07

## 2020-10-06 RX ORDER — ACETAMINOPHEN 325 MG/1
650 TABLET ORAL EVERY 4 HOURS PRN
Status: DISCONTINUED | OUTPATIENT
Start: 2020-10-06 | End: 2020-10-07 | Stop reason: HOSPADM

## 2020-10-06 RX ORDER — MORPHINE SULFATE 4 MG/ML
4 INJECTION, SOLUTION INTRAMUSCULAR; INTRAVENOUS
Status: COMPLETED | OUTPATIENT
Start: 2020-10-06 | End: 2020-10-06

## 2020-10-06 RX ORDER — SIMETHICONE 125 MG
125 TABLET,CHEWABLE ORAL EVERY 6 HOURS PRN
COMMUNITY

## 2020-10-06 RX ORDER — IBUPROFEN 200 MG
16 TABLET ORAL
Status: DISCONTINUED | OUTPATIENT
Start: 2020-10-06 | End: 2020-10-07 | Stop reason: HOSPADM

## 2020-10-06 RX ADMIN — SODIUM CHLORIDE, SODIUM LACTATE, POTASSIUM CHLORIDE, AND CALCIUM CHLORIDE: .6; .31; .03; .02 INJECTION, SOLUTION INTRAVENOUS at 10:10

## 2020-10-06 RX ADMIN — MORPHINE SULFATE 4 MG: 4 INJECTION INTRAVENOUS at 07:10

## 2020-10-06 RX ADMIN — HYDROMORPHONE HYDROCHLORIDE 1 MG: 1 INJECTION, SOLUTION INTRAMUSCULAR; INTRAVENOUS; SUBCUTANEOUS at 08:10

## 2020-10-06 RX ADMIN — ONDANSETRON 4 MG: 2 INJECTION INTRAMUSCULAR; INTRAVENOUS at 07:10

## 2020-10-06 RX ADMIN — PIPERACILLIN AND TAZOBACTAM 4.5 G: 4; .5 INJECTION, POWDER, LYOPHILIZED, FOR SOLUTION INTRAVENOUS; PARENTERAL at 08:10

## 2020-10-06 RX ADMIN — SODIUM CHLORIDE 1000 ML: 9 INJECTION, SOLUTION INTRAVENOUS at 06:10

## 2020-10-07 ENCOUNTER — ANESTHESIA EVENT (OUTPATIENT)
Dept: SURGERY | Facility: HOSPITAL | Age: 35
End: 2020-10-07
Payer: MEDICAID

## 2020-10-07 ENCOUNTER — ANESTHESIA (OUTPATIENT)
Dept: SURGERY | Facility: HOSPITAL | Age: 35
End: 2020-10-07
Payer: MEDICAID

## 2020-10-07 VITALS
HEIGHT: 67 IN | HEART RATE: 74 BPM | WEIGHT: 231 LBS | SYSTOLIC BLOOD PRESSURE: 123 MMHG | BODY MASS INDEX: 36.26 KG/M2 | RESPIRATION RATE: 20 BRPM | DIASTOLIC BLOOD PRESSURE: 74 MMHG | OXYGEN SATURATION: 98 % | TEMPERATURE: 98 F

## 2020-10-07 PROBLEM — Z90.49 S/P LAPAROSCOPIC CHOLECYSTECTOMY: Chronic | Status: ACTIVE | Noted: 2020-10-07

## 2020-10-07 PROBLEM — R10.11 RUQ ABDOMINAL PAIN: Status: RESOLVED | Noted: 2020-10-07 | Resolved: 2020-10-07

## 2020-10-07 PROBLEM — K80.20 CHOLELITHIASIS: Status: RESOLVED | Noted: 2020-10-06 | Resolved: 2020-10-07

## 2020-10-07 PROBLEM — R10.11 RUQ ABDOMINAL PAIN: Status: ACTIVE | Noted: 2020-10-07

## 2020-10-07 LAB
ALBUMIN SERPL BCP-MCNC: 2.9 G/DL (ref 3.5–5.2)
ALP SERPL-CCNC: 56 U/L (ref 55–135)
ALT SERPL W/O P-5'-P-CCNC: 70 U/L (ref 10–44)
ANION GAP SERPL CALC-SCNC: 5 MMOL/L (ref 8–16)
AST SERPL-CCNC: 88 U/L (ref 10–40)
BASOPHILS # BLD AUTO: 0.06 K/UL (ref 0–0.2)
BASOPHILS NFR BLD: 1 % (ref 0–1.9)
BILIRUB SERPL-MCNC: 0.6 MG/DL (ref 0.1–1)
BUN SERPL-MCNC: 8 MG/DL (ref 6–20)
CALCIUM SERPL-MCNC: 8.2 MG/DL (ref 8.7–10.5)
CHLORIDE SERPL-SCNC: 107 MMOL/L (ref 95–110)
CO2 SERPL-SCNC: 25 MMOL/L (ref 23–29)
CREAT SERPL-MCNC: 0.6 MG/DL (ref 0.5–1.4)
DIFFERENTIAL METHOD: ABNORMAL
EOSINOPHIL # BLD AUTO: 0.2 K/UL (ref 0–0.5)
EOSINOPHIL NFR BLD: 2.6 % (ref 0–8)
ERYTHROCYTE [DISTWIDTH] IN BLOOD BY AUTOMATED COUNT: 15.8 % (ref 11.5–14.5)
EST. GFR  (AFRICAN AMERICAN): >60 ML/MIN/1.73 M^2
EST. GFR  (NON AFRICAN AMERICAN): >60 ML/MIN/1.73 M^2
GLUCOSE SERPL-MCNC: 81 MG/DL (ref 70–110)
HCT VFR BLD AUTO: 35.8 % (ref 37–48.5)
HGB BLD-MCNC: 11.4 G/DL (ref 12–16)
IMM GRANULOCYTES # BLD AUTO: 0.02 K/UL (ref 0–0.04)
IMM GRANULOCYTES NFR BLD AUTO: 0.3 % (ref 0–0.5)
LYMPHOCYTES # BLD AUTO: 2.2 K/UL (ref 1–4.8)
LYMPHOCYTES NFR BLD: 37.3 % (ref 18–48)
MCH RBC QN AUTO: 28.2 PG (ref 27–31)
MCHC RBC AUTO-ENTMCNC: 31.8 G/DL (ref 32–36)
MCV RBC AUTO: 89 FL (ref 82–98)
MONOCYTES # BLD AUTO: 0.7 K/UL (ref 0.3–1)
MONOCYTES NFR BLD: 11.8 % (ref 4–15)
NEUTROPHILS # BLD AUTO: 2.8 K/UL (ref 1.8–7.7)
NEUTROPHILS NFR BLD: 47 % (ref 38–73)
NRBC BLD-RTO: 0 /100 WBC
PLATELET # BLD AUTO: 227 K/UL (ref 150–350)
PMV BLD AUTO: 12.1 FL (ref 9.2–12.9)
POTASSIUM SERPL-SCNC: 4.1 MMOL/L (ref 3.5–5.1)
PROT SERPL-MCNC: 6 G/DL (ref 6–8.4)
RBC # BLD AUTO: 4.04 M/UL (ref 4–5.4)
SODIUM SERPL-SCNC: 137 MMOL/L (ref 136–145)
WBC # BLD AUTO: 5.87 K/UL (ref 3.9–12.7)

## 2020-10-07 PROCEDURE — 27000080 OPTIME MED/SURG SUP & DEVICES GENERAL CLASSIFICATION: Performed by: SURGERY

## 2020-10-07 PROCEDURE — 63600175 PHARM REV CODE 636 W HCPCS: Performed by: SURGERY

## 2020-10-07 PROCEDURE — 00790 ANES IPER UPR ABD NOS: CPT | Performed by: SURGERY

## 2020-10-07 PROCEDURE — 25000003 PHARM REV CODE 250: Performed by: ANESTHESIOLOGY

## 2020-10-07 PROCEDURE — 47562 PR LAP,CHOLECYSTECTOMY: ICD-10-PCS | Mod: ,,, | Performed by: SURGERY

## 2020-10-07 PROCEDURE — 25000003 PHARM REV CODE 250: Performed by: SURGERY

## 2020-10-07 PROCEDURE — S0028 INJECTION, FAMOTIDINE, 20 MG: HCPCS | Performed by: NURSE ANESTHETIST, CERTIFIED REGISTERED

## 2020-10-07 PROCEDURE — 25000003 PHARM REV CODE 250: Performed by: NURSE ANESTHETIST, CERTIFIED REGISTERED

## 2020-10-07 PROCEDURE — 27202107 HC XP QUATRO SENSOR: Performed by: ANESTHESIOLOGY

## 2020-10-07 PROCEDURE — 36000709 HC OR TIME LEV III EA ADD 15 MIN: Performed by: SURGERY

## 2020-10-07 PROCEDURE — 63600175 PHARM REV CODE 636 W HCPCS: Performed by: ANESTHESIOLOGY

## 2020-10-07 PROCEDURE — 27000673 HC TUBING BLOOD Y: Performed by: ANESTHESIOLOGY

## 2020-10-07 PROCEDURE — 27201107 HC STYLET, STANDARD: Performed by: ANESTHESIOLOGY

## 2020-10-07 PROCEDURE — 63600175 PHARM REV CODE 636 W HCPCS: Performed by: INTERNAL MEDICINE

## 2020-10-07 PROCEDURE — 63600175 PHARM REV CODE 636 W HCPCS: Performed by: NURSE ANESTHETIST, CERTIFIED REGISTERED

## 2020-10-07 PROCEDURE — 37000008 HC ANESTHESIA 1ST 15 MINUTES: Performed by: SURGERY

## 2020-10-07 PROCEDURE — 47562 LAPAROSCOPIC CHOLECYSTECTOMY: CPT | Mod: ,,, | Performed by: SURGERY

## 2020-10-07 PROCEDURE — 27201423 OPTIME MED/SURG SUP & DEVICES STERILE SUPPLY: Performed by: SURGERY

## 2020-10-07 PROCEDURE — 27000671 HC TUBING MICROBORE EXT: Performed by: ANESTHESIOLOGY

## 2020-10-07 PROCEDURE — 36415 COLL VENOUS BLD VENIPUNCTURE: CPT

## 2020-10-07 PROCEDURE — 71000033 HC RECOVERY, INTIAL HOUR: Performed by: SURGERY

## 2020-10-07 PROCEDURE — G0378 HOSPITAL OBSERVATION PER HR: HCPCS

## 2020-10-07 PROCEDURE — 85025 COMPLETE CBC W/AUTO DIFF WBC: CPT

## 2020-10-07 PROCEDURE — 37000009 HC ANESTHESIA EA ADD 15 MINS: Performed by: SURGERY

## 2020-10-07 PROCEDURE — 80053 COMPREHEN METABOLIC PANEL: CPT

## 2020-10-07 PROCEDURE — 36000708 HC OR TIME LEV III 1ST 15 MIN: Performed by: SURGERY

## 2020-10-07 RX ORDER — PROPOFOL 10 MG/ML
VIAL (ML) INTRAVENOUS
Status: DISCONTINUED | OUTPATIENT
Start: 2020-10-07 | End: 2020-10-07

## 2020-10-07 RX ORDER — HYDROCODONE BITARTRATE AND ACETAMINOPHEN 5; 325 MG/1; MG/1
2 TABLET ORAL EVERY 6 HOURS PRN
Status: DISCONTINUED | OUTPATIENT
Start: 2020-10-07 | End: 2020-10-07 | Stop reason: HOSPADM

## 2020-10-07 RX ORDER — LIDOCAINE HYDROCHLORIDE 20 MG/ML
JELLY TOPICAL
Status: DISCONTINUED | OUTPATIENT
Start: 2020-10-07 | End: 2020-10-07

## 2020-10-07 RX ORDER — HYDROMORPHONE HYDROCHLORIDE 1 MG/ML
0.2 INJECTION, SOLUTION INTRAMUSCULAR; INTRAVENOUS; SUBCUTANEOUS
Status: DISCONTINUED | OUTPATIENT
Start: 2020-10-07 | End: 2020-10-07 | Stop reason: HOSPADM

## 2020-10-07 RX ORDER — ROCURONIUM BROMIDE 10 MG/ML
INJECTION, SOLUTION INTRAVENOUS
Status: DISCONTINUED | OUTPATIENT
Start: 2020-10-07 | End: 2020-10-07

## 2020-10-07 RX ORDER — LIDOCAINE HYDROCHLORIDE 20 MG/ML
INJECTION, SOLUTION EPIDURAL; INFILTRATION; INTRACAUDAL; PERINEURAL
Status: DISCONTINUED | OUTPATIENT
Start: 2020-10-07 | End: 2020-10-07

## 2020-10-07 RX ORDER — DEXAMETHASONE SODIUM PHOSPHATE 4 MG/ML
INJECTION, SOLUTION INTRA-ARTICULAR; INTRALESIONAL; INTRAMUSCULAR; INTRAVENOUS; SOFT TISSUE
Status: DISCONTINUED | OUTPATIENT
Start: 2020-10-07 | End: 2020-10-07

## 2020-10-07 RX ORDER — FENTANYL CITRATE 50 UG/ML
INJECTION, SOLUTION INTRAMUSCULAR; INTRAVENOUS
Status: DISCONTINUED | OUTPATIENT
Start: 2020-10-07 | End: 2020-10-07

## 2020-10-07 RX ORDER — OXYCODONE HYDROCHLORIDE 5 MG/1
5 TABLET ORAL
Status: DISCONTINUED | OUTPATIENT
Start: 2020-10-07 | End: 2020-10-07 | Stop reason: HOSPADM

## 2020-10-07 RX ORDER — MEPERIDINE HYDROCHLORIDE 50 MG/ML
12.5 INJECTION INTRAMUSCULAR; INTRAVENOUS; SUBCUTANEOUS EVERY 10 MIN PRN
Status: DISCONTINUED | OUTPATIENT
Start: 2020-10-07 | End: 2020-10-07 | Stop reason: HOSPADM

## 2020-10-07 RX ORDER — BUPIVACAINE HYDROCHLORIDE AND EPINEPHRINE 2.5; 5 MG/ML; UG/ML
INJECTION, SOLUTION EPIDURAL; INFILTRATION; INTRACAUDAL; PERINEURAL
Status: DISCONTINUED | OUTPATIENT
Start: 2020-10-07 | End: 2020-10-07 | Stop reason: HOSPADM

## 2020-10-07 RX ORDER — ONDANSETRON 2 MG/ML
4 INJECTION INTRAMUSCULAR; INTRAVENOUS DAILY PRN
Status: DISCONTINUED | OUTPATIENT
Start: 2020-10-07 | End: 2020-10-07 | Stop reason: HOSPADM

## 2020-10-07 RX ORDER — SUCCINYLCHOLINE CHLORIDE 20 MG/ML
INJECTION INTRAMUSCULAR; INTRAVENOUS
Status: DISCONTINUED | OUTPATIENT
Start: 2020-10-07 | End: 2020-10-07

## 2020-10-07 RX ORDER — SODIUM CHLORIDE 0.9 % (FLUSH) 0.9 %
10 SYRINGE (ML) INJECTION
Status: DISCONTINUED | OUTPATIENT
Start: 2020-10-07 | End: 2020-10-07 | Stop reason: HOSPADM

## 2020-10-07 RX ORDER — HYDROCODONE BITARTRATE AND ACETAMINOPHEN 10; 325 MG/1; MG/1
1 TABLET ORAL EVERY 6 HOURS PRN
Qty: 15 TABLET | Refills: 0 | Status: SHIPPED | OUTPATIENT
Start: 2020-10-07

## 2020-10-07 RX ORDER — MIDAZOLAM HYDROCHLORIDE 1 MG/ML
INJECTION INTRAMUSCULAR; INTRAVENOUS
Status: DISCONTINUED | OUTPATIENT
Start: 2020-10-07 | End: 2020-10-07

## 2020-10-07 RX ORDER — PHENYLEPHRINE HYDROCHLORIDE 10 MG/ML
INJECTION INTRAVENOUS
Status: DISCONTINUED | OUTPATIENT
Start: 2020-10-07 | End: 2020-10-07

## 2020-10-07 RX ORDER — DIPHENHYDRAMINE HYDROCHLORIDE 50 MG/ML
12.5 INJECTION INTRAMUSCULAR; INTRAVENOUS
Status: DISCONTINUED | OUTPATIENT
Start: 2020-10-07 | End: 2020-10-07 | Stop reason: HOSPADM

## 2020-10-07 RX ORDER — FAMOTIDINE 10 MG/ML
INJECTION INTRAVENOUS
Status: DISCONTINUED | OUTPATIENT
Start: 2020-10-07 | End: 2020-10-07

## 2020-10-07 RX ORDER — NEOSTIGMINE METHYLSULFATE 1 MG/ML
INJECTION, SOLUTION INTRAVENOUS
Status: DISCONTINUED | OUTPATIENT
Start: 2020-10-07 | End: 2020-10-07

## 2020-10-07 RX ORDER — SODIUM CHLORIDE, SODIUM LACTATE, POTASSIUM CHLORIDE, CALCIUM CHLORIDE 600; 310; 30; 20 MG/100ML; MG/100ML; MG/100ML; MG/100ML
INJECTION, SOLUTION INTRAVENOUS CONTINUOUS PRN
Status: DISCONTINUED | OUTPATIENT
Start: 2020-10-07 | End: 2020-10-07

## 2020-10-07 RX ADMIN — ROCURONIUM BROMIDE 10 MG: 10 INJECTION, SOLUTION INTRAVENOUS at 11:10

## 2020-10-07 RX ADMIN — ROCURONIUM BROMIDE 20 MG: 10 INJECTION, SOLUTION INTRAVENOUS at 12:10

## 2020-10-07 RX ADMIN — GLYCOPYRROLATE 0.8 MG: 0.2 INJECTION, SOLUTION INTRAMUSCULAR; INTRAVITREAL at 12:10

## 2020-10-07 RX ADMIN — MIDAZOLAM 2 MG: 1 INJECTION INTRAMUSCULAR; INTRAVENOUS at 11:10

## 2020-10-07 RX ADMIN — SUCCINYLCHOLINE CHLORIDE 160 MG: 20 INJECTION, SOLUTION INTRAMUSCULAR; INTRAVENOUS at 11:10

## 2020-10-07 RX ADMIN — PHENYLEPHRINE HYDROCHLORIDE 100 MCG: 10 INJECTION INTRAVENOUS at 12:10

## 2020-10-07 RX ADMIN — PIPERACILLIN SODIUM AND TAZOBACTAM SODIUM 3.38 G: 3; .375 INJECTION, POWDER, LYOPHILIZED, FOR SOLUTION INTRAVENOUS at 11:10

## 2020-10-07 RX ADMIN — FENTANYL CITRATE 100 MCG: 50 INJECTION, SOLUTION INTRAMUSCULAR; INTRAVENOUS at 11:10

## 2020-10-07 RX ADMIN — PROPOFOL 150 MG: 10 INJECTION, EMULSION INTRAVENOUS at 11:10

## 2020-10-07 RX ADMIN — DEXAMETHASONE SODIUM PHOSPHATE 8 MG: 4 INJECTION, SOLUTION INTRAMUSCULAR; INTRAVENOUS at 12:10

## 2020-10-07 RX ADMIN — ROCURONIUM BROMIDE 10 MG: 10 INJECTION, SOLUTION INTRAVENOUS at 12:10

## 2020-10-07 RX ADMIN — ONDANSETRON 4 MG: 2 INJECTION INTRAMUSCULAR; INTRAVENOUS at 11:10

## 2020-10-07 RX ADMIN — OXYCODONE HYDROCHLORIDE 5 MG: 5 TABLET ORAL at 01:10

## 2020-10-07 RX ADMIN — PIPERACILLIN SODIUM AND TAZOBACTAM SODIUM 3.38 G: 3; .375 INJECTION, POWDER, LYOPHILIZED, FOR SOLUTION INTRAVENOUS at 05:10

## 2020-10-07 RX ADMIN — HYDROMORPHONE HYDROCHLORIDE 0.2 MG: 1 INJECTION, SOLUTION INTRAMUSCULAR; INTRAVENOUS; SUBCUTANEOUS at 01:10

## 2020-10-07 RX ADMIN — LIDOCAINE HYDROCHLORIDE 100 MG: 20 INJECTION, SOLUTION EPIDURAL; INFILTRATION; INTRACAUDAL; PERINEURAL at 11:10

## 2020-10-07 RX ADMIN — SODIUM CHLORIDE, SODIUM LACTATE, POTASSIUM CHLORIDE, AND CALCIUM CHLORIDE: .6; .31; .03; .02 INJECTION, SOLUTION INTRAVENOUS at 11:10

## 2020-10-07 RX ADMIN — SODIUM CHLORIDE, SODIUM LACTATE, POTASSIUM CHLORIDE, AND CALCIUM CHLORIDE: .6; .31; .03; .02 INJECTION, SOLUTION INTRAVENOUS at 12:10

## 2020-10-07 RX ADMIN — NEOSTIGMINE METHYLSULFATE 5 MG: 1 INJECTION INTRAVENOUS at 12:10

## 2020-10-07 RX ADMIN — LIDOCAINE HYDROCHLORIDE 5 ML: 20 JELLY TOPICAL at 11:10

## 2020-10-07 RX ADMIN — FAMOTIDINE 20 MG: 10 INJECTION, SOLUTION INTRAVENOUS at 12:10

## 2020-10-07 NOTE — PROGRESS NOTES
"[facility]  Adult Nutrition   Progress Note (Initial Assessment)     SUMMARY     Recommendations  Recommendation/Intervention: 1. RD to follow for diet progression post surgery 2. Will f/u to complete pt interview and assess for malnutrition  Goals: 1. Oral diet progressed w/in 48 hrs.  Nutrition Goal Status: new  Communication of RD Recs: reviewed with RN    Dietitian Rounds Brief    Pt seen for MST score. NPO at this time. Unable to interview--pt being prepped for lap cholecystectomy this AM. IVFs @ 100 ml/hr. LBM 10/6. No weight history available on chart. RD to follow and monitor.    Reason for Assessment  Reason For Assessment: identified at risk by screening criteria(MST score (2))  Diagnosis: other (see comments)(symptomatic cholelithiasis)  Relevant Medical History: + tobacco use, allergic rhinitis  Interdisciplinary Rounds: attended    Nutrition Risk Screen  Nutrition Risk Screen: no indicators present     MST Score: 2  Have you recently lost weight without trying?: Yes: 2-13 lbs  Weight loss score: 1  Have you been eating poorly because of a decreased appetite?: Yes  Appetite score: 1       Nutrition/Diet History  Food Allergies: NKFA  Factors Affecting Nutritional Intake: NPO    Anthropometrics  Temp: 97.8 °F (36.6 °C)  Height Method: Stated  Height: 5' 7" (170.2 cm)  Height (inches): 67 in  Weight Method: Bed Scale  Weight: 104.8 kg (231 lb)  Weight (lb): 231 lb  Ideal Body Weight (IBW), Female: 135 lb  % Ideal Body Weight, Female (lb): 171.11 %  BMI (Calculated): 36.2  BMI Grade: 35 - 39.9 - obesity - grade II       Weight History:  Wt Readings from Last 10 Encounters:   10/07/20 104.8 kg (231 lb)       Lab/Procedures/Meds: Pertinent Labs Reviewed    Clinical Chemistry:  Recent Labs   Lab 10/06/20  1853 10/07/20  0500    137   K 3.9 4.1    107   CO2 25 25   GLU 97 81   BUN 10 8   CREATININE 0.6 0.6   CALCIUM 8.8 8.2*   PROT 6.9 6.0   ALBUMIN 3.6 2.9*   BILITOT 0.3 0.6 "   ALKPHOS 53* 56   AST 14 88*   ALT 15 70*   ANIONGAP 7* 5*   ESTGFRAFRICA >60.0 >60.0   EGFRNONAA >60.0 >60.0   LIPASE 27  --        CBC:   Recent Labs   Lab 10/07/20  0500   WBC 5.87   RBC 4.04   HGB 11.4*   HCT 35.8*      MCV 89   MCH 28.2   MCHC 31.8*         Medications: Pertinent Medications reviewed    Scheduled Meds:   piperacillin-tazobactam (ZOSYN) IVPB  3.375 g Intravenous Q8H       Continuous Infusions:   lactated ringers 100 mL/hr at 10/06/20 2240       PRN Meds:.acetaminophen, calcium chloride IVPB, calcium chloride IVPB, calcium chloride IVPB, dextrose 50%, dextrose 50%, glucagon (human recombinant), glucose, glucose, HYDROcodone-acetaminophen, HYDROmorphone, magnesium oxide, magnesium sulfate IVPB, magnesium sulfate IVPB, magnesium sulfate IVPB, magnesium sulfate IVPB, melatonin, ondansetron, potassium chloride in water, potassium chloride in water, potassium chloride in water, potassium chloride in water, potassium chloride, potassium chloride, potassium chloride, potassium chloride, sodium chloride 0.9%, sodium phosphate IVPB, sodium phosphate IVPB, sodium phosphate IVPB, sodium phosphate IVPB, sodium phosphate IVPB    Estimated/Assessed Needs  Weight Used For Calorie Calculations: 104.8 kg (231 lb 0.7 oz)  Energy Calorie Requirements (kcal): 2096 (20 kcal/kg)  Energy Need Method: Kcal/kg  Protein Requirements: 91 g (1.5 g/kg) per IBW  Weight Used For Protein Calculations: 61 kg (134 lb 7.7 oz)(IBW)     Estimated Fluid Requirement Method: RDA Method  RDA Method (mL): 2096       Nutrition Prescription Ordered  Current Diet Order: NPO    Evaluation of Received Nutrient/Fluid Intake  IV Fluid (mL): 2400(LR @ 100 ml/hr)  Energy Calories Required: not meeting needs  Protein Required: not meeting needs  Fluid Required: meeting needs  Tolerance: other (see comments)(NPO)     Intake/Output Summary (Last 24 hours) at 10/7/2020 1003  Last data filed at 10/7/2020 0530  Gross per 24 hour   Intake 2000  ml   Output --   Net 2000 ml        % Meal Intake: NPO    Nutrition Risk  Level of Risk/Frequency of Follow-up: high     Monitor and Evaluation  Food and Nutrient Intake: energy intake, food and beverage intake  Food and Nutrient Adminstration: diet order  Physical Activity and Function: nutrition-related ADLs and IADLs  Anthropometric Measurements: weight, weight change, body mass index  Biochemical Data, Medical Tests and Procedures: gastrointestinal profile, electrolyte and renal panel, glucose/endocrine profile  Nutrition-Focused Physical Findings: overall appearance     Nutrition Follow-Up  RD Follow-up?: Yes    Kezia Mccracken RD 10/07/2020 10:03 AM

## 2020-10-07 NOTE — ASSESSMENT & PLAN NOTE
Increasing frequency of abdominal pain associated with nausea and vomiting.  Ultrasound with 11 mm gallstone within neck of gallbladder with distended gallbladder at 10.9 cm  Clinical and radiological no evidence of acute cholecystitis.  No evidence of Mirizzi syndrome at this time.  Admit observation  NPO  IV fluid hydration  P.r.n. antiemetics and analgesics  Empiric antibiotics  A.m. labs ordered  Consulting general surgery

## 2020-10-07 NOTE — SUBJECTIVE & OBJECTIVE
Past Medical History:   Diagnosis Date    Allergic rhinitis     Nicotine abuse     Obesity        Past Surgical History:   Procedure Laterality Date    APPENDECTOMY      Caesarean section      EYE SURGERY      TUBAL LIGATION         Review of patient's allergies indicates:  No Known Allergies    No current facility-administered medications on file prior to encounter.      Current Outpatient Medications on File Prior to Encounter   Medication Sig    ibuprofen (ADVIL,MOTRIN) 200 MG tablet Take 400 mg by mouth every 6 (six) hours as needed for Pain. Patient states she is only taking this dose once or twice a day    ondansetron (ZOFRAN) 4 MG tablet Take 8 mg by mouth 2 (two) times daily.    simethicone (MYLICON) 125 MG chewable tablet Take 125 mg by mouth every 6 (six) hours as needed for Flatulence.     Family History     None        Tobacco Use    Smoking status: Current Every Day Smoker     Packs/day: 1.00     Types: Cigarettes   Substance and Sexual Activity    Alcohol use: Yes     Comment: rarely    Drug use: Not on file    Sexual activity: Not on file     Review of Systems   Constitutional: Negative for chills and fever.   HENT: Positive for congestion and postnasal drip.    Respiratory: Negative for cough and shortness of breath.    Cardiovascular: Negative for chest pain, palpitations and leg swelling.   Gastrointestinal: Positive for abdominal pain, nausea and vomiting.   Genitourinary: Negative for difficulty urinating.   Musculoskeletal: Negative for back pain.   Skin: Negative for wound.   Allergic/Immunologic: Negative for immunocompromised state.   Neurological: Negative for headaches.   Hematological: Does not bruise/bleed easily.   Psychiatric/Behavioral: Negative for confusion.     Objective:     Vital Signs (Most Recent):  Temp: 98.3 °F (36.8 °C) (10/06/20 1826)  Pulse: 62 (10/06/20 1935)  Resp: 18 (10/06/20 2015)  BP: 110/60 (10/06/20 1935)  SpO2: 98 % (10/06/20 1935) Vital Signs (24h  Range):  Temp:  [98.3 °F (36.8 °C)] 98.3 °F (36.8 °C)  Pulse:  [] 62  Resp:  [18-20] 18  SpO2:  [98 %-99 %] 98 %  BP: (110-166)/(60-89) 110/60     Weight: 104.3 kg (230 lb)  Body mass index is 36.02 kg/m².    Physical Exam  Vitals signs and nursing note reviewed.   Constitutional:       General: She is not in acute distress.     Appearance: Normal appearance. She is obese. She is not ill-appearing, toxic-appearing or diaphoretic.   HENT:      Head: Normocephalic and atraumatic.      Comments: Wearing surgical mask     Mouth/Throat:      Mouth: Mucous membranes are moist.   Eyes:      General:         Right eye: No discharge.         Left eye: No discharge.      Extraocular Movements: Extraocular movements intact.      Conjunctiva/sclera: Conjunctivae normal.   Neck:      Musculoskeletal: Neck supple.   Cardiovascular:      Rate and Rhythm: Normal rate and regular rhythm.      Pulses: Normal pulses.      Heart sounds: Normal heart sounds.      Comments: No lower extremity edema, palpable peripheral pulses  Pulmonary:      Effort: Pulmonary effort is normal. No respiratory distress.      Breath sounds: Normal breath sounds. No stridor. No wheezing or rhonchi.      Comments: Not on supplemental oxygen  Abdominal:      Comments: Nondistended, soft, abdominal striae, epigastric and right upper quadrant tenderness to palpation without any peritoneal signs, bowel sounds heard   Genitourinary:     Comments: No Johnson catheter present  Skin:     General: Skin is warm and dry.      Capillary Refill: Capillary refill takes less than 2 seconds.   Neurological:      General: No focal deficit present.      Mental Status: She is alert and oriented to person, place, and time.      Cranial Nerves: No cranial nerve deficit.      Motor: No weakness.   Psychiatric:         Mood and Affect: Mood normal.             Significant Labs:   Blood Culture: No results for input(s): LABBLOO in the last 48 hours.  BMP:   Recent Labs   Lab  10/06/20  1853   GLU 97      K 3.9      CO2 25   BUN 10   CREATININE 0.6   CALCIUM 8.8     CBC:   Recent Labs   Lab 10/06/20  1853   WBC 11.22   HGB 11.9*   HCT 37.3        CMP:   Recent Labs   Lab 10/06/20  1853      K 3.9      CO2 25   GLU 97   BUN 10   CREATININE 0.6   CALCIUM 8.8   PROT 6.9   ALBUMIN 3.6   BILITOT 0.3   ALKPHOS 53*   AST 14   ALT 15   ANIONGAP 7*   EGFRNONAA >60.0     Cardiac Markers: No results for input(s): CKMB, MYOGLOBIN, BNP, TROPISTAT in the last 48 hours.  Lactic Acid: No results for input(s): LACTATE in the last 48 hours.  Lipid Panel: No results for input(s): CHOL, HDL, LDLCALC, TRIG, CHOLHDL in the last 48 hours.  Magnesium: No results for input(s): MG in the last 48 hours.  POCT Glucose: No results for input(s): POCTGLUCOSE in the last 48 hours.  Prealbumin: No results for input(s): PREALBUMIN in the last 48 hours.  Respiratory Culture: No results for input(s): GSRESP, RESPIRATORYC in the last 48 hours.  TSH: No results for input(s): TSH in the last 4320 hours.  Urine Culture: No results for input(s): LABURIN in the last 48 hours.  Urine Studies:   Recent Labs   Lab 10/06/20  1853   COLORU Yellow   APPEARANCEUA Clear   PHUR 7.0   SPECGRAV 1.025   PROTEINUA Negative   GLUCUA Negative   KETONESU Negative   BILIRUBINUA Negative   OCCULTUA Negative   NITRITE Negative   UROBILINOGEN 2.0-3.0*   LEUKOCYTESUR Negative     All pertinent labs within the past 24 hours have been reviewed.    Significant Imaging: I have reviewed all pertinent imaging results/findings within the past 24 hours.     Us Abdomen Limited    Result Date: 10/6/2020  EXAMINATION: US ABDOMEN LIMITED CLINICAL HISTORY: Abdominal Pain - Gallbladder; COMPARISON: None FINDINGS: Liver maintains normal echogenicity without focal mass or intrahepatic bile duct dilation. Hepatopedal flow present in main portal vein. There is a 11 mm gallstone within the neck of the gallbladder.  The gallbladder is  distended measuring 10.9 cm.  There is no gallbladder wall thickening or pericholecystic edema.  Common bile duct measures 4 mm. Visualized pancreas is unremarkable with bowel gas obscuring portions of pancreas. Right kidney is free of hydronephrosis. Visualized abdominal aorta is nonaneurysmal. Juxtahepatic IVC is unremarkable.     11 mm stone within the neck of the gallbladder with distended gallbladder.  There is no gallbladder wall thickening.  There is no intrahepatic biliary duct dilatation.  Correlation with labs is recommended. Electronically signed by: Shanice Beverly MD Date:    10/06/2020 Time:    19:40

## 2020-10-07 NOTE — H&P
[facility] Medicine  History & Physical    Patient Name: Anu Mario  MRN: [id number]  Admission Date: 10/6/2020  Attending Physician: Brandin Gutierrez MD   Primary Care Provider: No primary care provider on file.         Patient information was obtained from patient, past medical records and ER records.     Subjective:     Principal Problem:Cholelithiasis    Chief Complaint:   Chief Complaint   Patient presents with    Abdominal Pain     RUQ known gallstones        HPI: Mrs. Mario is a 35-years-old female who presented to the ED with chief complaint of abdominal pain.  Patient reports 2 years history of intermittent abdominal pain, worsening over the last 6 months.  States previously she was told she had sludge in her gallbladder, more recently was told she had 2 gallstones.  She reports since yesterday developed epigastric abdominal discomfort, radiating to the right upper quadrant and wrapping around to the back, squeezing sensation, lasting about 2-5 hours associated with severe nausea and nonbloody emesis.  She states she has been now getting these attacks more frequently, typically 2-3 per week.  Denies any fever, chills, states she at times does have mild diarrhea following episode of pain.  Reports history of rheumatoid arthritis not on any medications.  No significant past family history.  In the ED afebrile, labs with WBC 11.2, LFTs normal except ALP 53, ultrasound with 11 mm gallstone within the neck of the gallbladder with gallbladder distension at 10.9 cm.  She received morphine, Dilaudid, Zofran as well as 1 L normal saline.  Case discussed with ED provider who did discussed with general surgery (Dr Casey) who recommended antibiotics and admission, consultation will be done tomorrow.  Patient was updated plan of care.    Past Medical History:   Diagnosis Date    Allergic rhinitis     Nicotine abuse     Obesity        Past Surgical History:   Procedure Laterality Date     APPENDECTOMY      Caesarean section      EYE SURGERY      TUBAL LIGATION         Review of patient's allergies indicates:  No Known Allergies    No current facility-administered medications on file prior to encounter.      Current Outpatient Medications on File Prior to Encounter   Medication Sig    ibuprofen (ADVIL,MOTRIN) 200 MG tablet Take 400 mg by mouth every 6 (six) hours as needed for Pain. Patient states she is only taking this dose once or twice a day    ondansetron (ZOFRAN) 4 MG tablet Take 8 mg by mouth 2 (two) times daily.    simethicone (MYLICON) 125 MG chewable tablet Take 125 mg by mouth every 6 (six) hours as needed for Flatulence.     Family History     None        Tobacco Use    Smoking status: Current Every Day Smoker     Packs/day: 1.00     Types: Cigarettes   Substance and Sexual Activity    Alcohol use: Yes     Comment: rarely    Drug use: Not on file    Sexual activity: Not on file     Review of Systems   Constitutional: Negative for chills and fever.   HENT: Positive for congestion and postnasal drip.    Respiratory: Negative for cough and shortness of breath.    Cardiovascular: Negative for chest pain, palpitations and leg swelling.   Gastrointestinal: Positive for abdominal pain, nausea and vomiting.   Genitourinary: Negative for difficulty urinating.   Musculoskeletal: Negative for back pain.   Skin: Negative for wound.   Allergic/Immunologic: Negative for immunocompromised state.   Neurological: Negative for headaches.   Hematological: Does not bruise/bleed easily.   Psychiatric/Behavioral: Negative for confusion.     Objective:     Vital Signs (Most Recent):  Temp: 98.3 °F (36.8 °C) (10/06/20 1826)  Pulse: 62 (10/06/20 1935)  Resp: 18 (10/06/20 2015)  BP: 110/60 (10/06/20 1935)  SpO2: 98 % (10/06/20 1935) Vital Signs (24h Range):  Temp:  [98.3 °F (36.8 °C)] 98.3 °F (36.8 °C)  Pulse:  [] 62  Resp:  [18-20] 18  SpO2:  [98 %-99 %] 98 %  BP: (110-166)/(60-89) 110/60      Weight: 104.3 kg (230 lb)  Body mass index is 36.02 kg/m².    Physical Exam  Vitals signs and nursing note reviewed.   Constitutional:       General: She is not in acute distress.     Appearance: Normal appearance. She is obese. She is not ill-appearing, toxic-appearing or diaphoretic.   HENT:      Head: Normocephalic and atraumatic.      Comments: Wearing surgical mask     Mouth/Throat:      Mouth: Mucous membranes are moist.   Eyes:      General:         Right eye: No discharge.         Left eye: No discharge.      Extraocular Movements: Extraocular movements intact.      Conjunctiva/sclera: Conjunctivae normal.   Neck:      Musculoskeletal: Neck supple.   Cardiovascular:      Rate and Rhythm: Normal rate and regular rhythm.      Pulses: Normal pulses.      Heart sounds: Normal heart sounds.      Comments: No lower extremity edema, palpable peripheral pulses  Pulmonary:      Effort: Pulmonary effort is normal. No respiratory distress.      Breath sounds: Normal breath sounds. No stridor. No wheezing or rhonchi.      Comments: Not on supplemental oxygen  Abdominal:      Comments: Nondistended, soft, abdominal striae, epigastric and right upper quadrant tenderness to palpation without any peritoneal signs, bowel sounds heard   Genitourinary:     Comments: No Johnson catheter present  Skin:     General: Skin is warm and dry.      Capillary Refill: Capillary refill takes less than 2 seconds.   Neurological:      General: No focal deficit present.      Mental Status: She is alert and oriented to person, place, and time.      Cranial Nerves: No cranial nerve deficit.      Motor: No weakness.   Psychiatric:         Mood and Affect: Mood normal.             Significant Labs:   Blood Culture: No results for input(s): LABBLOO in the last 48 hours.  BMP:   Recent Labs   Lab 10/06/20  1853   GLU 97      K 3.9      CO2 25   BUN 10   CREATININE 0.6   CALCIUM 8.8     CBC:   Recent Labs   Lab 10/06/20  1853   WBC  11.22   HGB 11.9*   HCT 37.3        CMP:   Recent Labs   Lab 10/06/20  1853      K 3.9      CO2 25   GLU 97   BUN 10   CREATININE 0.6   CALCIUM 8.8   PROT 6.9   ALBUMIN 3.6   BILITOT 0.3   ALKPHOS 53*   AST 14   ALT 15   ANIONGAP 7*   EGFRNONAA >60.0     Cardiac Markers: No results for input(s): CKMB, MYOGLOBIN, BNP, TROPISTAT in the last 48 hours.  Lactic Acid: No results for input(s): LACTATE in the last 48 hours.  Lipid Panel: No results for input(s): CHOL, HDL, LDLCALC, TRIG, CHOLHDL in the last 48 hours.  Magnesium: No results for input(s): MG in the last 48 hours.  POCT Glucose: No results for input(s): POCTGLUCOSE in the last 48 hours.  Prealbumin: No results for input(s): PREALBUMIN in the last 48 hours.  Respiratory Culture: No results for input(s): GSRESP, RESPIRATORYC in the last 48 hours.  TSH: No results for input(s): TSH in the last 4320 hours.  Urine Culture: No results for input(s): LABURIN in the last 48 hours.  Urine Studies:   Recent Labs   Lab 10/06/20  1853   COLORU Yellow   APPEARANCEUA Clear   PHUR 7.0   SPECGRAV 1.025   PROTEINUA Negative   GLUCUA Negative   KETONESU Negative   BILIRUBINUA Negative   OCCULTUA Negative   NITRITE Negative   UROBILINOGEN 2.0-3.0*   LEUKOCYTESUR Negative     All pertinent labs within the past 24 hours have been reviewed.    Significant Imaging: I have reviewed all pertinent imaging results/findings within the past 24 hours.     Us Abdomen Limited    Result Date: 10/6/2020  EXAMINATION: US ABDOMEN LIMITED CLINICAL HISTORY: Abdominal Pain - Gallbladder; COMPARISON: None FINDINGS: Liver maintains normal echogenicity without focal mass or intrahepatic bile duct dilation. Hepatopedal flow present in main portal vein. There is a 11 mm gallstone within the neck of the gallbladder.  The gallbladder is distended measuring 10.9 cm.  There is no gallbladder wall thickening or pericholecystic edema.  Common bile duct measures 4 mm. Visualized pancreas is  unremarkable with bowel gas obscuring portions of pancreas. Right kidney is free of hydronephrosis. Visualized abdominal aorta is nonaneurysmal. Juxtahepatic IVC is unremarkable.     11 mm stone within the neck of the gallbladder with distended gallbladder.  There is no gallbladder wall thickening.  There is no intrahepatic biliary duct dilatation.  Correlation with labs is recommended. Electronically signed by: Shanice Beverly MD Date:    10/06/2020 Time:    19:40          Assessment/Plan:     * Symptomatic cholelithiasis  Increasing frequency of abdominal pain associated with nausea and vomiting.  Ultrasound with 11 mm gallstone within neck of gallbladder with distended gallbladder at 10.9 cm  Clinical and radiological no evidence of acute cholecystitis.  No evidence of Mirizzi syndrome at this time.  Admit observation  NPO  IV fluid hydration  P.r.n. antiemetics and analgesics  Empiric antibiotics  A.m. labs ordered  Consulting general surgery      Class 2 obesity in adult  BMI 36.  Needs lifestyle modification      Nicotine abuse  Current smoker 1 pack per day.  Smoking cessation counseling.  Declined nicotine patch or pharmacotherapy.        VTE Risk Mitigation (From admission, onward)         Ordered     IP VTE HIGH RISK PATIENT  Once      10/06/20 2032     Place sequential compression device  Until discontinued      10/06/20 2032                   Tram Lincoln MD  Department of Hospital Medicine   [facility]

## 2020-10-07 NOTE — TRANSFER OF CARE
"Anesthesia Transfer of Care Note    Patient: Anu Mario    Procedure(s) Performed: Procedure(s) (LRB):  CHOLECYSTECTOMY, LAPAROSCOPIC (N/A)    Patient location: PACU    Anesthesia Type: general    Transport from OR: Transported from OR on room air with adequate spontaneous ventilation    Post pain: adequate analgesia    Post assessment: no apparent anesthetic complications and tolerated procedure well    Post vital signs: stable    Level of consciousness: awake, alert and oriented    Nausea/Vomiting: no nausea/vomiting    Complications: none    Transfer of care protocol was followed      Last vitals:   Visit Vitals  /70   Pulse 76   Temp 36.4 °C (97.6 °F) (Temporal)   Resp 20   Ht 5' 7" (1.702 m)   Wt 104.8 kg (231 lb)   LMP 09/21/2020   SpO2 99%   Breastfeeding No   BMI 36.18 kg/m²     "

## 2020-10-07 NOTE — SUBJECTIVE & OBJECTIVE
No current facility-administered medications on file prior to encounter.      Current Outpatient Medications on File Prior to Encounter   Medication Sig    ibuprofen (ADVIL,MOTRIN) 200 MG tablet Take 400 mg by mouth every 6 (six) hours as needed for Pain. Patient states she is only taking this dose once or twice a day    ondansetron (ZOFRAN) 4 MG tablet Take 8 mg by mouth 2 (two) times daily.    simethicone (MYLICON) 125 MG chewable tablet Take 125 mg by mouth every 6 (six) hours as needed for Flatulence.       Review of patient's allergies indicates:  No Known Allergies    Past Medical History:   Diagnosis Date    Allergic rhinitis     Nicotine abuse     Obesity      Past Surgical History:   Procedure Laterality Date    APPENDECTOMY      Caesarean section      EYE SURGERY      TUBAL LIGATION       Family History     None        Tobacco Use    Smoking status: Current Every Day Smoker     Packs/day: 1.00     Types: Cigarettes   Substance and Sexual Activity    Alcohol use: Yes     Comment: rarely    Drug use: Not on file    Sexual activity: Not on file     Review of Systems   Constitutional: Negative for appetite change, chills, fever and unexpected weight change.   HENT: Negative for hearing loss, rhinorrhea, sore throat and voice change.    Eyes: Negative for photophobia and visual disturbance.   Respiratory: Negative for cough, choking and shortness of breath.    Cardiovascular: Negative for chest pain, palpitations and leg swelling.   Gastrointestinal: Positive for abdominal pain, nausea and vomiting. Negative for blood in stool, constipation and diarrhea.   Endocrine: Negative for cold intolerance, heat intolerance, polydipsia and polyuria.   Musculoskeletal: Positive for back pain. Negative for arthralgias, joint swelling and neck stiffness.   Skin: Negative for color change, pallor and rash.   Neurological: Negative for dizziness, seizures, syncope and headaches.   Hematological: Negative for  adenopathy. Does not bruise/bleed easily.   Psychiatric/Behavioral: Negative for agitation, behavioral problems and confusion.     Objective:     Vital Signs (Most Recent):  Temp: 97.8 °F (36.6 °C) (10/07/20 0715)  Pulse: 70 (10/07/20 0715)  Resp: 18 (10/07/20 0715)  BP: 118/70 (10/07/20 0715)  SpO2: 100 % (10/07/20 0715) Vital Signs (24h Range):  Temp:  [97.8 °F (36.6 °C)-98.7 °F (37.1 °C)] 97.8 °F (36.6 °C)  Pulse:  [] 70  Resp:  [16-20] 18  SpO2:  [90 %-100 %] 100 %  BP: (110-166)/(60-89) 118/70     Weight: 104.8 kg (231 lb)  Body mass index is 36.18 kg/m².    Physical Exam  Constitutional:       General: She is not in acute distress.     Appearance: Normal appearance. She is well-developed. She is not toxic-appearing.   HENT:      Head: Normocephalic and atraumatic. No abrasion or laceration.      Right Ear: External ear normal.      Left Ear: External ear normal.      Nose: Nose normal.   Eyes:      Pupils: Pupils are equal, round, and reactive to light.   Neck:      Musculoskeletal: Normal range of motion.      Thyroid: No thyroid mass or thyromegaly.      Trachea: Trachea normal. No tracheal deviation.   Cardiovascular:      Rate and Rhythm: Normal rate and regular rhythm.   Pulmonary:      Effort: Pulmonary effort is normal. No tachypnea, accessory muscle usage or respiratory distress.   Abdominal:      General: Bowel sounds are normal. There is no distension.      Palpations: Abdomen is soft. There is no mass.      Tenderness: There is abdominal tenderness in the right upper quadrant. Negative signs include Parker's sign and McBurney's sign.      Hernia: No hernia is present.   Lymphadenopathy:      Cervical: No cervical adenopathy.   Skin:     General: Skin is warm.   Neurological:      Mental Status: She is alert.      Coordination: Coordination normal.      Gait: Gait normal.   Psychiatric:         Speech: Speech normal.         Behavior: Behavior normal.         Significant Labs:  CBC:   Recent  Labs   Lab 10/07/20  0500   WBC 5.87   RBC 4.04   HGB 11.4*   HCT 35.8*      MCV 89   MCH 28.2   MCHC 31.8*     CMP:   Recent Labs   Lab 10/07/20  0500   GLU 81   CALCIUM 8.2*   ALBUMIN 2.9*   PROT 6.0      K 4.1   CO2 25      BUN 8   CREATININE 0.6   ALKPHOS 56   ALT 70*   AST 88*   BILITOT 0.6       Significant Diagnostics:  I have reviewed all pertinent imaging results/findings within the past 24 hours.     US -   Impression:     11 mm stone within the neck of the gallbladder with distended gallbladder.  There is no gallbladder wall thickening.  There is no intrahepatic biliary duct dilatation.  Correlation with labs is recommended.

## 2020-10-07 NOTE — ASSESSMENT & PLAN NOTE
Chronic Cholecystitis    TO OR for Lap vs Open Cara today  If straightforward, patient may be discharged home this afternoon

## 2020-10-07 NOTE — PLAN OF CARE
10/07/20 1612   Discharge Assessment   Assessment Type Discharge Planning Assessment   Assessment information obtained from? Medical Record   Discharge Plan A Home   Discharge Plan B Home

## 2020-10-07 NOTE — HPI
Patient is a 35-year-old female with a 2 year history of intermittent epigastric and right upper quadrant pain radiating to her back.  Initially this was associated with eating fatty and fried foods.  She has modified her diet but over the last 6 months her episodes have been increasing in in frequency.  They occur 2-3 times per week and last anywhere from 2-5 hours.  In June she was diagnosed with gallstones.  Her episodes are associated with nausea and multiple episodes of vomiting.  She denies fever or chills.  She has had severe episodes every day for the last 3 days.

## 2020-10-07 NOTE — PLAN OF CARE
10/07/20 1624   Final Note   Assessment Type Final Discharge Note   Anticipated Discharge Disposition Home   No needs at this time.

## 2020-10-07 NOTE — DISCHARGE SUMMARY
[facility] Medicine  Discharge Summary      Patient Name: Anu Mario  MRN: [id number]  Admission Date: 10/6/2020  Hospital Length of Stay: 0 days  Discharge Date and Time:  10/07/2020 4:09 PM  Attending Physician: Rui Brandon MD   Discharging Provider: Rui Brandon MD  Primary Care Provider: Primary Doctor No      HPI:   Mrs. Mario is a 35-years-old female who presented to the ED with chief complaint of abdominal pain.  Patient reports 2 years history of intermittent abdominal pain, worsening over the last 6 months.  States previously she was told she had sludge in her gallbladder, more recently was told she had 2 gallstones.  She reports since yesterday developed epigastric abdominal discomfort, radiating to the right upper quadrant and wrapping around to the back, squeezing sensation, lasting about 2-5 hours associated with severe nausea and nonbloody emesis.  She states she has been now getting these attacks more frequently, typically 2-3 per week.  Denies any fever, chills, states she at times does have mild diarrhea following episode of pain.  Reports history of rheumatoid arthritis not on any medications.  No significant past family history.  In the ED afebrile, labs with WBC 11.2, LFTs normal except ALP 53, ultrasound with 11 mm gallstone within the neck of the gallbladder with gallbladder distension at 10.9 cm.  She received morphine, Dilaudid, Zofran as well as 1 L normal saline.  Case discussed with ED provider who did discussed with general surgery (Dr Casey) who recommended antibiotics and admission, consultation will be done tomorrow.  Patient was updated plan of care.    Procedure(s) (LRB):  CHOLECYSTECTOMY, LAPAROSCOPIC (N/A)      Hospital Course:   Patient was admitted for symptomatic cholelithiasis found to have a distended gallbladder up 10.9 cm with and 11 mm gallstone within the neck of the gallbladder.  No evidence of acute cholangitis or cholecystitis.   Patient had laparoscopic cholecystectomy on 10/07/2020.  Patient tolerated procedure well. Symptoms resolved after surgery.  Patient tolerated diet and was having flatus on day of discharge.  Patient was stable at discharge to home with instructions to follow-up with general surgery in 2 weeks.    Patient was seen and examined at 4:05 PM on day of discharge.    General: Patient resting comfortably in no acute distress. Appears as stated age. Calm. Obese  Eyes: EOM intact. No conjunctivae injection. No scleral icterus.  ENT: Hearing grossly intact. No discharge from ears. No nasal discharge.   CVS: RRR. No LE edema BL.  Lungs: CTA BL, no wheezing or crackles. Good breath sounds. No accessory muscle use. No acute respiratory distress  Neuro: AOx3. GCS 15. Cranial nerves grossly intact. Moves all extremities equally. Follows commands. Responds appropriately      Consults:   Consults (From admission, onward)        Status Ordering Provider     Inpatient consult to General surgery  Once     Provider:  Juan Casey MD    Completed MAGALIE LEE     Inpatient consult to Hospitalist  Once     Provider:  Tram Lincoln MD    Acknowledged FLORECITA EDWARDS          No new Assessment & Plan notes have been filed under this hospital service since the last note was generated.  Service: Hospital Medicine    Final Active Diagnoses:    Diagnosis Date Noted POA    S/P laparoscopic cholecystectomy [Z90.49] 10/07/2020 Not Applicable     Chronic    Class 2 obesity in adult [E66.9] 10/06/2020 Yes     Chronic    Nicotine abuse [Z72.0] 10/06/2020 Yes     Chronic      Problems Resolved During this Admission:    Diagnosis Date Noted Date Resolved POA    PRINCIPAL PROBLEM:  Symptomatic cholelithiasis [K80.20] 10/06/2020 10/07/2020 Yes    RUQ abdominal pain [R10.11] 10/07/2020 10/07/2020 Yes       Discharged Condition: stable    Disposition: Home or Self Care    Follow Up:  Follow-up Information     Florecita Edwards MD  In 2 weeks.    Specialties: General Surgery, Surgery  Why: For wound re-check, surgery follow-up  Contact information:  [address]  [address]  [facility] [address]  [phone]             [facility].    Specialty: Emergency Medicine  Why: As needed  Contact information:  [address]  [facility] [address] [phone]  Additional information:  1st floor               Patient Instructions:      Diet Cardiac     Notify your health care provider if you experience any of the following:  temperature >100.4     Notify your health care provider if you experience any of the following:  persistent nausea and vomiting or diarrhea     Notify your health care provider if you experience any of the following:  severe uncontrolled pain     Notify your health care provider if you experience any of the following:  redness, tenderness, or signs of infection (pain, swelling, redness, odor or green/yellow discharge around incision site)     Notify your health care provider if you experience any of the following:  difficulty breathing or increased cough     Notify your health care provider if you experience any of the following:  severe persistent headache     Notify your health care provider if you experience any of the following:  worsening rash     Notify your health care provider if you experience any of the following:  persistent dizziness, light-headedness, or visual disturbances     Notify your health care provider if you experience any of the following:  increased confusion or weakness     Activity as tolerated       Significant Diagnostic Studies: Labs:   CMP   Recent Labs   Lab 10/06/20  1853 10/07/20  0500    137   K 3.9 4.1    107   CO2 25 25   GLU 97 81   BUN 10 8   CREATININE 0.6 0.6   CALCIUM 8.8 8.2*   PROT 6.9 6.0   ALBUMIN 3.6 2.9*   BILITOT 0.3 0.6   ALKPHOS 53* 56   AST 14 88*   ALT 15 70*   ANIONGAP 7* 5*   ESTGFRAFRICA >60.0 >60.0   EGFRNONAA >60.0 >60.0    and CBC   Recent  Labs   Lab 10/06/20  1853 10/07/20  0500   WBC 11.22 5.87   HGB 11.9* 11.4*   HCT 37.3 35.8*    227       Pending Diagnostic Studies:     Procedure Component Value Units Date/Time    Specimen to Pathology - Surgery [[id number]] Collected: 10/07/20 1226    Order Status: Sent Lab Status: No result     Specimen: Tissue          Medications:  Reconciled Home Medications:      Medication List      START taking these medications    HYDROcodone-acetaminophen  mg per tablet  Commonly known as: NORCO  Take 1 tablet by mouth every 6 (six) hours as needed for Pain.        CONTINUE taking these medications    ibuprofen 200 MG tablet  Commonly known as: ADVIL,MOTRIN  Take 400 mg by mouth every 6 (six) hours as needed for Pain. Patient states she is only taking this dose once or twice a day     ondansetron 4 MG tablet  Commonly known as: ZOFRAN  Take 8 mg by mouth 2 (two) times daily.     simethicone 125 MG chewable tablet  Commonly known as: MYLICON  Take 125 mg by mouth every 6 (six) hours as needed for Flatulence.            Indwelling Lines/Drains at time of discharge:   Lines/Drains/Airways     None                 Time spent on the discharge of patient: 25 minutes  Patient was seen and examined on the date of discharge and determined to be suitable for discharge.         Rui Brandon MD  Department of Hospital Medicine  [facility]  Date of service: 10/07/2020

## 2020-10-07 NOTE — ED PROVIDER NOTES
Encounter Date: 10/6/2020       History     Chief Complaint   Patient presents with    Abdominal Pain     RUQ known gallstones     Patient is a 35-year-old female with medical history of gallstones presenting to the ED for right upper quadrant abdominal pain.  Patient states she was advised she had gallstones 2 years ago and has had intermittent pain since that time.  Patient states pain increased last night.  Patient states pain increased this afternoon after eating subway and eating preg was.  Patient states nausea vomiting started this afternoon.  Patient denies any fever, chills, or chest pain.      The history is provided by the patient and the spouse.     Review of patient's allergies indicates:  No Known Allergies  History reviewed. No pertinent past medical history.  History reviewed. No pertinent surgical history.  History reviewed. No pertinent family history.  Social History     Tobacco Use    Smoking status: Current Every Day Smoker     Packs/day: 1.00     Types: Cigarettes   Substance Use Topics    Alcohol use: Not on file    Drug use: Not on file     Review of Systems   Constitutional: Positive for activity change and appetite change. Negative for fever.   HENT: Negative for sore throat.    Respiratory: Negative for shortness of breath.    Cardiovascular: Negative for chest pain.   Gastrointestinal: Positive for abdominal pain, nausea and vomiting. Negative for constipation and diarrhea.   Genitourinary: Negative for decreased urine volume, difficulty urinating, dysuria and urgency.   Musculoskeletal: Negative for back pain and myalgias.   Skin: Negative for rash.   Allergic/Immunologic: Negative for immunocompromised state.   Neurological: Negative for dizziness, syncope, weakness, numbness and headaches.   Hematological: Does not bruise/bleed easily.   All other systems reviewed and are negative.      Physical Exam     Initial Vitals [10/06/20 1826]   BP Pulse Resp Temp SpO2   (!) 166/89 110 20  98.3 °F (36.8 °C) 98 %      MAP       --         Physical Exam    Nursing note and vitals reviewed.  Constitutional: Vital signs are normal. She appears well-developed and well-nourished. She is cooperative. No distress.   HENT:   Head: Normocephalic and atraumatic.   Mouth/Throat: Uvula is midline, oropharynx is clear and moist and mucous membranes are normal.   Eyes: Conjunctivae, EOM and lids are normal. Pupils are equal, round, and reactive to light.   Neck: Trachea normal, normal range of motion and phonation normal. Neck supple.   Cardiovascular: Normal rate, regular rhythm and intact distal pulses.   Pulses:       Radial pulses are 2+ on the right side and 2+ on the left side.   Pulmonary/Chest: Effort normal and breath sounds normal.   Abdominal: Soft. Normal appearance and bowel sounds are normal. She exhibits no distension. There is abdominal tenderness in the right upper quadrant. There is positive Parker's sign.   Neurological: She is alert and oriented to person, place, and time. She has normal strength. No sensory deficit. GCS eye subscore is 4. GCS verbal subscore is 5. GCS motor subscore is 6.   Skin: Skin is warm, dry and intact. Capillary refill takes 2 to 3 seconds. No pallor.         ED Course   Procedures  Labs Reviewed   CBC W/ AUTO DIFFERENTIAL - Abnormal; Notable for the following components:       Result Value    Hemoglobin 11.9 (*)     Mean Corpuscular Hemoglobin Conc 31.9 (*)     RDW 15.4 (*)     Mono # 1.2 (*)     All other components within normal limits   COMPREHENSIVE METABOLIC PANEL - Abnormal; Notable for the following components:    Alkaline Phosphatase 53 (*)     Anion Gap 7 (*)     All other components within normal limits   URINALYSIS, REFLEX TO URINE CULTURE - Abnormal; Notable for the following components:    Urobilinogen, UA 2.0-3.0 (*)     All other components within normal limits    Narrative:     Specimen Source->Urine   LIPASE   SARS-COV-2 RNA AMPLIFICATION, QUAL           Imaging Results          US Abdomen Limited (Final result)  Result time 10/06/20 19:40:09    Final result by Shanice Beverly MD (10/06/20 19:40:09)                 Impression:      11 mm stone within the neck of the gallbladder with distended gallbladder.  There is no gallbladder wall thickening.  There is no intrahepatic biliary duct dilatation.  Correlation with labs is recommended.      Electronically signed by: Shanice Beverly MD  Date:    10/06/2020  Time:    19:40             Narrative:    EXAMINATION:  US ABDOMEN LIMITED    CLINICAL HISTORY:  Abdominal Pain - Gallbladder;    COMPARISON:  None    FINDINGS:  Liver maintains normal echogenicity without focal mass or intrahepatic bile duct dilation. Hepatopedal flow present in main portal vein.    There is a 11 mm gallstone within the neck of the gallbladder.  The gallbladder is distended measuring 10.9 cm.  There is no gallbladder wall thickening or pericholecystic edema.  Common bile duct measures 4 mm.    Visualized pancreas is unremarkable with bowel gas obscuring portions of pancreas. Right kidney is free of hydronephrosis. Visualized abdominal aorta is nonaneurysmal. Juxtahepatic IVC is unremarkable.                                 Medical Decision Making:   Initial Assessment:   Emergent evaluation of a 35-year-old female presenting to the ED for right upper quadrant abdominal pain for the past 2 days.  Patient states pain increased tonight after eating [employer] and [place] chips.   Patient has associated nausea and vomiting.  On exam patient is A&O x3.  Patient is distressed due to pain.  Breath sounds clear bilaterally.  Abdomen soft with right upper quadrant tenderness to palpation noted.  Bowel sounds within normal limits.  Cap refill less than 3 seconds.  Differential Diagnosis:   Differential diagnoses include but are not limited to cholecystitis, cholelithiasis, hepatitis, appendicitis, peptic ulcer disease, pancreatitis, gastritis,   musculoskeletal, others.    Clinical Tests:   Lab Tests: Ordered and Reviewed  The following lab test(s) were unremarkable: CMP, CBC, Urinalysis and Lipase  Radiological Study: Ordered and Reviewed  ED Management:  I will get labs, imaging, medicate, hydrate and reassess.  I discussed this case with my supervising physician.                   ED Course as of Oct 06 2036   Tue Oct 06, 2020   1945 Patient's CBC unremarkable.  No leukocytosis noted.  H&H stable.  CMP unremarkable.  UA negative for any acute infectious process.  Ultrasound shows 11 mm stone stuck in pancreatic duct with dilated gallbladder.    [RZ]   2000 Discussed case with Dr. wilkins, general surgery.  He recommends starting IV antibiotics admit to hospitalist service.  Surgery team will follow in the morning.    [RZ]   2010 Discussed case with hospital medicine.  Will admit patient for further evaluation treatment.  Patient and  updated on plan of care.  All questions and concerns addressed.    [RZ]      ED Course User Index  [RZ] Miya Dixon NP            Clinical Impression:     ICD-10-CM ICD-9-CM   1. RUQ abdominal pain  R10.11 789.01   2. Cholelithiasis  K80.20 574.20   3. Chest pain  R07.9 786.50                      Disposition:   Disposition: Admitted  Condition: Stable     ED Disposition Condition    Observation                             Miya Dixon NP  10/06/20 2036

## 2020-10-07 NOTE — CONSULTS
[facility]  General Surgery  Consult Note    Patient Name: Anu Mario  MRN: [id number]  Code Status: Full Code  Admission Date: 10/6/2020  Hospital Length of Stay: 0 days  Attending Physician: Rui Brandon MD  Primary Care Provider: Primary Doctor No    Patient information was obtained from patient and ER records.     Inpatient consult to General surgery  Consult performed by: Florecita Edwards MD  Consult ordered by: Miya Dixon NP        Subjective:     Principal Problem: Cholelithiasis    History of Present Illness: Patient is a 35-year-old female with a 2 year history of intermittent epigastric and right upper quadrant pain radiating to her back.  Initially this was associated with eating fatty and fried foods.  She has modified her diet but over the last 6 months her episodes have been increasing in in frequency.  They occur 2-3 times per week and last anywhere from 2-5 hours.  In June she was diagnosed with gallstones.  Her episodes are associated with nausea and multiple episodes of vomiting.  She denies fever or chills.  She has had severe episodes every day for the last 3 days.    No current facility-administered medications on file prior to encounter.      Current Outpatient Medications on File Prior to Encounter   Medication Sig    ibuprofen (ADVIL,MOTRIN) 200 MG tablet Take 400 mg by mouth every 6 (six) hours as needed for Pain. Patient states she is only taking this dose once or twice a day    ondansetron (ZOFRAN) 4 MG tablet Take 8 mg by mouth 2 (two) times daily.    simethicone (MYLICON) 125 MG chewable tablet Take 125 mg by mouth every 6 (six) hours as needed for Flatulence.       Review of patient's allergies indicates:  No Known Allergies    Past Medical History:   Diagnosis Date    Allergic rhinitis     Nicotine abuse     Obesity      Past Surgical History:   Procedure Laterality Date    APPENDECTOMY      Caesarean section      EYE SURGERY      TUBAL  LIGATION       Family History     None        Tobacco Use    Smoking status: Current Every Day Smoker     Packs/day: 1.00     Types: Cigarettes   Substance and Sexual Activity    Alcohol use: Yes     Comment: rarely    Drug use: Not on file    Sexual activity: Not on file     Review of Systems   Constitutional: Negative for appetite change, chills, fever and unexpected weight change.   HENT: Negative for hearing loss, rhinorrhea, sore throat and voice change.    Eyes: Negative for photophobia and visual disturbance.   Respiratory: Negative for cough, choking and shortness of breath.    Cardiovascular: Negative for chest pain, palpitations and leg swelling.   Gastrointestinal: Positive for abdominal pain, nausea and vomiting. Negative for blood in stool, constipation and diarrhea.   Endocrine: Negative for cold intolerance, heat intolerance, polydipsia and polyuria.   Musculoskeletal: Positive for back pain. Negative for arthralgias, joint swelling and neck stiffness.   Skin: Negative for color change, pallor and rash.   Neurological: Negative for dizziness, seizures, syncope and headaches.   Hematological: Negative for adenopathy. Does not bruise/bleed easily.   Psychiatric/Behavioral: Negative for agitation, behavioral problems and confusion.     Objective:     Vital Signs (Most Recent):  Temp: 97.8 °F (36.6 °C) (10/07/20 0715)  Pulse: 70 (10/07/20 0715)  Resp: 18 (10/07/20 0715)  BP: 118/70 (10/07/20 0715)  SpO2: 100 % (10/07/20 0715) Vital Signs (24h Range):  Temp:  [97.8 °F (36.6 °C)-98.7 °F (37.1 °C)] 97.8 °F (36.6 °C)  Pulse:  [] 70  Resp:  [16-20] 18  SpO2:  [90 %-100 %] 100 %  BP: (110-166)/(60-89) 118/70     Weight: 104.8 kg (231 lb)  Body mass index is 36.18 kg/m².    Physical Exam  Constitutional:       General: She is not in acute distress.     Appearance: Normal appearance. She is well-developed. She is not toxic-appearing.   HENT:      Head: Normocephalic and atraumatic. No abrasion or  laceration.      Right Ear: External ear normal.      Left Ear: External ear normal.      Nose: Nose normal.   Eyes:      Pupils: Pupils are equal, round, and reactive to light.   Neck:      Musculoskeletal: Normal range of motion.      Thyroid: No thyroid mass or thyromegaly.      Trachea: Trachea normal. No tracheal deviation.   Cardiovascular:      Rate and Rhythm: Normal rate and regular rhythm.   Pulmonary:      Effort: Pulmonary effort is normal. No tachypnea, accessory muscle usage or respiratory distress.   Abdominal:      General: Bowel sounds are normal. There is no distension.      Palpations: Abdomen is soft. There is no mass.      Tenderness: There is abdominal tenderness in the right upper quadrant. Negative signs include Parker's sign and McBurney's sign.      Hernia: No hernia is present.   Lymphadenopathy:      Cervical: No cervical adenopathy.   Skin:     General: Skin is warm.   Neurological:      Mental Status: She is alert.      Coordination: Coordination normal.      Gait: Gait normal.   Psychiatric:         Speech: Speech normal.         Behavior: Behavior normal.         Significant Labs:  CBC:   Recent Labs   Lab 10/07/20  0500   WBC 5.87   RBC 4.04   HGB 11.4*   HCT 35.8*      MCV 89   MCH 28.2   MCHC 31.8*     CMP:   Recent Labs   Lab 10/07/20  0500   GLU 81   CALCIUM 8.2*   ALBUMIN 2.9*   PROT 6.0      K 4.1   CO2 25      BUN 8   CREATININE 0.6   ALKPHOS 56   ALT 70*   AST 88*   BILITOT 0.6       Significant Diagnostics:  I have reviewed all pertinent imaging results/findings within the past 24 hours.     US -   Impression:     11 mm stone within the neck of the gallbladder with distended gallbladder.  There is no gallbladder wall thickening.  There is no intrahepatic biliary duct dilatation.  Correlation with labs is recommended.    Assessment/Plan:     * Symptomatic cholelithiasis  Chronic Cholecystitis    TO OR for Lap vs Open Cara today  If straightforward, patient  may be discharged home this afternoon      VTE Risk Mitigation (From admission, onward)         Ordered     IP VTE HIGH RISK PATIENT  Once      10/06/20 2032     Place sequential compression device  Until discontinued      10/06/20 2032                Thank you for your consult. I will follow-up with patient. Please contact us if you have any additional questions.    Florecita Edwards MD  General Surgery  [facility]

## 2020-10-07 NOTE — ASSESSMENT & PLAN NOTE
Current smoker 1 pack per day.  Smoking cessation counseling.  Declined nicotine patch or pharmacotherapy.

## 2020-10-07 NOTE — HOSPITAL COURSE
Patient was admitted for symptomatic cholelithiasis found to have a distended gallbladder up 10.9 cm with and 11 mm gallstone within the neck of the gallbladder.  No evidence of acute cholangitis or cholecystitis.  Patient had laparoscopic cholecystectomy on 10/07/2020.  Patient tolerated procedure well. Symptoms resolved after surgery.  Patient tolerated diet and was having flatus on day of discharge.  Patient was stable at discharge to home with instructions to follow-up with general surgery in 2 weeks.    Patient was seen and examined at 4:05 PM on day of discharge.    General: Patient resting comfortably in no acute distress. Appears as stated age. Calm. Obese  Eyes: EOM intact. No conjunctivae injection. No scleral icterus.  ENT: Hearing grossly intact. No discharge from ears. No nasal discharge.   CVS: RRR. No LE edema BL.  Lungs: CTA BL, no wheezing or crackles. Good breath sounds. No accessory muscle use. No acute respiratory distress  Neuro: AOx3. GCS 15. Cranial nerves grossly intact. Moves all extremities equally. Follows commands. Responds appropriately

## 2020-10-07 NOTE — ANESTHESIA PROCEDURE NOTES
Intubation  Performed by: Victorina Delaney CRNA  Authorized by: Dallas Reyes Jr., MD     Intubation:     Induction:  Intravenous    Intubated:  Postinduction    Mask Ventilation:  N/a    Attempts:  1    Attempted By:  CRNA    Method of Intubation:  Direct    Blade:  Kathleen 2    Laryngeal View Grade: Grade I - full view of chords      Difficult Airway Encountered?: No      Complications:  None    Airway Device:  Oral endotracheal tube    Airway Device Size:  7.5    Style/Cuff Inflation:  Cuffed    Tube secured:  22    Secured at:  The lips    Placement Verified By:  Capnometry    Complicating Factors:  None    Findings Post-Intubation:  BS equal bilateral and atraumatic/condition of teeth unchanged

## 2020-10-07 NOTE — ANESTHESIA POSTPROCEDURE EVALUATION
Anesthesia Post Evaluation    Patient: Anu Mario    Procedure(s) Performed: Procedure(s) (LRB):  CHOLECYSTECTOMY, LAPAROSCOPIC (N/A)    Final Anesthesia Type: general    Patient location during evaluation: PACU  Patient participation: Yes- Able to Participate  Level of consciousness: awake and alert  Post-procedure vital signs: reviewed and stable  Pain management: adequate  Airway patency: patent  MATTIE mitigation strategies: Multimodal analgesia, Extubation while patient is awake, Verification of full reversal of neuromuscular block and Extubation and recovery carried out in lateral, semiupright, or other nonsupine position  PONV status at discharge: No PONV  Anesthetic complications: no      Cardiovascular status: hemodynamically stable  Respiratory status: unassisted, spontaneous ventilation and room air  Hydration status: euvolemic  Follow-up not needed.          Vitals Value Taken Time   /63 10/07/20 1345   Temp 36.4 °C (97.6 °F) 10/07/20 1308   Pulse 59 10/07/20 1349   Resp 21 10/07/20 1349   SpO2 96 % 10/07/20 1349   Vitals shown include unvalidated device data.      No case tracking events are documented in the log.      Pain/Peter Score: Pain Rating Prior to Med Admin: 6 (10/7/2020  1:41 PM)  Peter Score: 8 (10/7/2020  1:08 PM)

## 2020-10-07 NOTE — OP NOTE
[facility]  Surgery Department  Operative Note    SUMMARY     Date of Procedure: 10/7/2020     Procedure: Procedure(s) (LRB):  CHOLECYSTECTOMY, LAPAROSCOPIC (N/A)     Surgeon(s) and Role:     * Florecita Edwards MD - Primary    Assisting Surgeon: None    Pre-Operative Diagnosis: Calculus of gallbladder without cholecystitis without obstruction [K80.20]    Post-Operative Diagnosis: Post-Op Diagnosis Codes:     * Calculus of gallbladder without cholecystitis without obstruction [K80.20]    Anesthesia: General    Description of the Procedure:   After informed consent was obtained and placed on the chart, the patient was taken to the operating room and placed supine on the operating room table.  Appropriate intraoperative monitoring devices were placed and general endotracheal anesthesia induced by Anesthesia staff.  The patient was then prepped and draped in standard surgical fashion.      A #11 blade was used to make an infraumbilical incision.  Kochers were used to grasp the fascia and a Veress needle was inserted into the abdomen.  Pneumoperitoneum was established without difficulty. A 12 mm trocar was placed at this site under direct vision with no complications upon establishing laparoscopy.  A 5 mm trocar was then placed under direct vision in the subxiphoid area with no active bleeding.  Two additional 5 mm trocars were placed on the right side of the patient's abdomen under direct vision with no active bleeding.  The gallbladder was grasped and retracted superiorly and laterally to expose the hilar structures.  The cystic duct was carefully dissected.  This structure was triply clipped and ligated.  The cystic artery was identified and dissected.  This structure was doubly clipped and ligated.  Good hemostasis was noted.  The gallbladder was then removed from the liver bed using electrocautery with good hemostasis.  The gallbladder was removed from the abdomen using an Endo-Catch bag from the  umbilical port site.  The pneumoperitoneum was released and all trocars removed.  The umbilical port site fascia was closed using 0 Vicryl in a figure-of-8 fashion and 4-0 Monocryl in an interrupted subcuticular fashion was used to close the skin incisions.  The wounds were cleaned and dried and benzoin and Steri-Strips placed. Outer dressings were placed.    All sponge, needle, and instrument counts were correct at the end of the case.  The patient tolerated the procedure well, was extubated in the operating room, and transported to the recovery room awake, alert, in good condition.        Complications: No    Estimated Blood Loss (EBL): 5ml           Implants: * No implants in log *    Specimens:   Specimen (12h ago, onward)     Start     Ordered    10/07/20 1225  Specimen to Pathology - Surgery  Once     Comments: Pre-op Diagnosis: Calculus of gallbladder without cholecystitis without obstruction [K80.20]Procedure(s):CHOLECYSTECTOMY, LAPAROSCOPIC Number of specimens: 1.Name of specimens: 1. Gallbladder      10/07/20 6456                        Condition: Good    Disposition: PACU - hemodynamically stable.

## 2020-10-07 NOTE — HPI
Mrs. Mario is a 35-years-old female who presented to the ED with chief complaint of abdominal pain.  Patient reports 2 years history of intermittent abdominal pain, worsening over the last 6 months.  States previously she was told she had sludge in her gallbladder, more recently was told she had 2 gallstones.  She reports since yesterday developed epigastric abdominal discomfort, radiating to the right upper quadrant and wrapping around to the back, squeezing sensation, lasting about 2-5 hours associated with severe nausea and nonbloody emesis.  She states she has been now getting these attacks more frequently, typically 2-3 per week.  Denies any fever, chills, states she at times does have mild diarrhea following episode of pain.  Reports history of rheumatoid arthritis not on any medications.  No significant past family history.  In the ED afebrile, labs with WBC 11.2, LFTs normal except ALP 53, ultrasound with 11 mm gallstone within the neck of the gallbladder with gallbladder distension at 10.9 cm.  She received morphine, Dilaudid, Zofran as well as 1 L normal saline.  Case discussed with ED provider who did discussed with general surgery (Dr Casey) who recommended antibiotics and admission, consultation will be done tomorrow.  Patient was updated plan of care.

## 2020-10-07 NOTE — ANESTHESIA PREPROCEDURE EVALUATION
10/07/2020  Anu Mario is a 35 y.o., female.    Patient Active Problem List   Diagnosis    Symptomatic cholelithiasis    Class 2 obesity in adult    Nicotine abuse       Past Surgical History:   Procedure Laterality Date    APPENDECTOMY      Caesarean section      EYE SURGERY      TUBAL LIGATION          Tobacco Use:  The patient  reports that she has been smoking cigarettes. She has been smoking about 1.00 pack per day. She does not have any smokeless tobacco history on file.     No results found for this or any previous visit.     Imaging Results          US Abdomen Limited (Final result)  Result time 10/06/20 19:40:09    Final result by Shanice Beverly MD (10/06/20 19:40:09)                 Impression:      11 mm stone within the neck of the gallbladder with distended gallbladder.  There is no gallbladder wall thickening.  There is no intrahepatic biliary duct dilatation.  Correlation with labs is recommended.      Electronically signed by: Shanice Beevrly MD  Date:    10/06/2020  Time:    19:40             Narrative:    EXAMINATION:  US ABDOMEN LIMITED    CLINICAL HISTORY:  Abdominal Pain - Gallbladder;    COMPARISON:  None    FINDINGS:  Liver maintains normal echogenicity without focal mass or intrahepatic bile duct dilation. Hepatopedal flow present in main portal vein.    There is a 11 mm gallstone within the neck of the gallbladder.  The gallbladder is distended measuring 10.9 cm.  There is no gallbladder wall thickening or pericholecystic edema.  Common bile duct measures 4 mm.    Visualized pancreas is unremarkable with bowel gas obscuring portions of pancreas. Right kidney is free of hydronephrosis. Visualized abdominal aorta is nonaneurysmal. Juxtahepatic IVC is unremarkable.                                 Lab Results   Component Value Date    WBC 5.87 10/07/2020    HGB 11.4 (L)  10/07/2020    HCT 35.8 (L) 10/07/2020    MCV 89 10/07/2020     10/07/2020     BMP  Lab Results   Component Value Date     10/07/2020    K 4.1 10/07/2020     10/07/2020    CO2 25 10/07/2020    BUN 8 10/07/2020    CREATININE 0.6 10/07/2020    CALCIUM 8.2 (L) 10/07/2020    ANIONGAP 5 (L) 10/07/2020    GLU 81 10/07/2020    GLU 97 10/06/2020       No results found for this or any previous visit.      Anesthesia Evaluation    I have reviewed the Patient Summary Reports.    I have reviewed the Nursing Notes. I have reviewed the NPO Status.   I have reviewed the Medications.     Review of Systems  Anesthesia Hx:  No problems with previous Anesthesia Denies Hx of Anesthetic complications  Denies Family Hx of Anesthesia complications.   Denies Personal Hx of Anesthesia complications.   Social:  Smoker, Alcohol Use    Hematology/Oncology:     Oncology Normal    -- Anemia:   EENT/Dental:EENT/Dental Normal   Cardiovascular:  Cardiovascular Normal  ECG has been reviewed.    Pulmonary:  Pulmonary Normal    Renal/:  Renal/ Normal     Hepatic/GI:  Hepatic/GI Normal Nausea vomiting associated with pain yesterday   Musculoskeletal:   Arthritis  Rheumatoid arthritis affecting the patient's hands knees and feet without complaints of neck pain   Neurological:  Neurology Normal    Endocrine:  Endocrine Normal    Dermatological:  Skin Normal    Psych:  Psychiatric Normal           Physical Exam  General:  Well nourished    Airway/Jaw/Neck:  Airway Findings: Mouth Opening: Normal Tongue: Normal  General Airway Assessment: Adult  Mallampati: III  Improves to II with phonation.  TM Distance: < 4 cm  Jaw/Neck Findings:  Neck ROM: Normal ROM      Dental:  Dental Findings:    Chest/Lungs:  Chest/Lungs Findings: Clear to auscultation, Normal Respiratory Rate     Heart/Vascular:  Heart Findings: Rate: Normal  Rhythm: Regular Rhythm  Sounds: Normal        Mental Status:  Mental Status Findings:  Cooperative, Alert and  Oriented         Anesthesia Plan  Type of Anesthesia, risks & benefits discussed:  Anesthesia Type:  general  Patient's Preference:   Intra-op Monitoring Plan: standard ASA monitors  Intra-op Monitoring Plan Comments:   Post Op Pain Control Plan: per primary service following discharge from PACU, IV/PO Opioids PRN and multimodal analgesia  Post Op Pain Control Plan Comments:   Induction:    Beta Blocker:  Patient is not currently on a Beta-Blocker (No further documentation required).       Informed Consent: Patient understands risks and agrees with Anesthesia plan.  Questions answered. Anesthesia consent signed with patient.  ASA Score: 2     Day of Surgery Review of History & Physical:        Anesthesia Plan Notes: GETA / RSI  Decadron 8 mg iv   Zofran 4mg iv  Pepcid 20mg iv           Ready For Surgery From Anesthesia Perspective.

## 2020-10-23 ENCOUNTER — OFFICE VISIT (OUTPATIENT)
Dept: SURGERY | Facility: CLINIC | Age: 35
End: 2020-10-23
Payer: MEDICAID

## 2020-10-23 VITALS
HEART RATE: 83 BPM | WEIGHT: 220 LBS | HEIGHT: 67 IN | BODY MASS INDEX: 34.53 KG/M2 | RESPIRATION RATE: 20 BRPM | TEMPERATURE: 98 F | DIASTOLIC BLOOD PRESSURE: 79 MMHG | SYSTOLIC BLOOD PRESSURE: 126 MMHG

## 2020-10-23 DIAGNOSIS — K81.1 CHRONIC CHOLECYSTITIS: Primary | ICD-10-CM

## 2020-10-23 PROCEDURE — 99024 PR POST-OP FOLLOW-UP VISIT: ICD-10-PCS | Mod: S$GLB,,, | Performed by: SURGERY

## 2020-10-23 PROCEDURE — 99024 POSTOP FOLLOW-UP VISIT: CPT | Mod: S$GLB,,, | Performed by: SURGERY

## 2021-04-29 ENCOUNTER — PATIENT MESSAGE (OUTPATIENT)
Dept: RESEARCH | Facility: HOSPITAL | Age: 36
End: 2021-04-29

## 2021-08-24 ENCOUNTER — CLINICAL SUPPORT (OUTPATIENT)
Dept: FAMILY MEDICINE | Facility: CLINIC | Age: 36
End: 2021-08-24
Payer: MEDICAID

## 2021-08-24 DIAGNOSIS — Z20.822 EXPOSURE TO COVID-19 VIRUS: Primary | ICD-10-CM

## 2021-08-24 LAB — SARS-COV-2 RDRP RESP QL NAA+PROBE: NEGATIVE

## 2021-08-24 PROCEDURE — U0002 COVID-19 LAB TEST NON-CDC: HCPCS | Performed by: FAMILY MEDICINE

## 2022-11-28 ENCOUNTER — HOSPITAL ENCOUNTER (EMERGENCY)
Facility: HOSPITAL | Age: 37
Discharge: LEFT BEFORE DISCHARGE | End: 2022-11-28
Attending: EMERGENCY MEDICINE
Payer: MEDICAID

## 2022-11-28 VITALS
HEART RATE: 68 BPM | OXYGEN SATURATION: 97 % | BODY MASS INDEX: 39.24 KG/M2 | RESPIRATION RATE: 18 BRPM | HEIGHT: 67 IN | DIASTOLIC BLOOD PRESSURE: 73 MMHG | SYSTOLIC BLOOD PRESSURE: 129 MMHG | TEMPERATURE: 98 F | WEIGHT: 250 LBS

## 2022-11-28 DIAGNOSIS — J34.89 MASS OF NASAL SINUS: Primary | ICD-10-CM

## 2022-11-28 LAB
ALBUMIN SERPL BCP-MCNC: 3.2 G/DL (ref 3.5–5.2)
ALP SERPL-CCNC: 65 U/L (ref 55–135)
ALT SERPL W/O P-5'-P-CCNC: 37 U/L (ref 10–44)
ANION GAP SERPL CALC-SCNC: 5 MMOL/L (ref 8–16)
AST SERPL-CCNC: 20 U/L (ref 10–40)
B-HCG UR QL: NEGATIVE
BASOPHILS # BLD AUTO: 0.07 K/UL (ref 0–0.2)
BASOPHILS NFR BLD: 0.6 % (ref 0–1.9)
BILIRUB SERPL-MCNC: 0.4 MG/DL (ref 0.1–1)
BUN SERPL-MCNC: 13 MG/DL (ref 6–20)
CALCIUM SERPL-MCNC: 8.3 MG/DL (ref 8.7–10.5)
CHLORIDE SERPL-SCNC: 101 MMOL/L (ref 95–110)
CO2 SERPL-SCNC: 25 MMOL/L (ref 23–29)
CREAT SERPL-MCNC: 0.5 MG/DL (ref 0.5–1.4)
CTP QC/QA: YES
DIFFERENTIAL METHOD: ABNORMAL
EOSINOPHIL # BLD AUTO: 0.2 K/UL (ref 0–0.5)
EOSINOPHIL NFR BLD: 1.8 % (ref 0–8)
ERYTHROCYTE [DISTWIDTH] IN BLOOD BY AUTOMATED COUNT: 14.7 % (ref 11.5–14.5)
EST. GFR  (NO RACE VARIABLE): >60 ML/MIN/1.73 M^2
GLUCOSE SERPL-MCNC: 96 MG/DL (ref 70–110)
HCT VFR BLD AUTO: 38.1 % (ref 37–48.5)
HGB BLD-MCNC: 11.9 G/DL (ref 12–16)
IMM GRANULOCYTES # BLD AUTO: 0.1 K/UL (ref 0–0.04)
IMM GRANULOCYTES NFR BLD AUTO: 0.8 % (ref 0–0.5)
LYMPHOCYTES # BLD AUTO: 2.3 K/UL (ref 1–4.8)
LYMPHOCYTES NFR BLD: 17.9 % (ref 18–48)
MCH RBC QN AUTO: 29.2 PG (ref 27–31)
MCHC RBC AUTO-ENTMCNC: 31.2 G/DL (ref 32–36)
MCV RBC AUTO: 94 FL (ref 82–98)
MONOCYTES # BLD AUTO: 1.2 K/UL (ref 0.3–1)
MONOCYTES NFR BLD: 9.8 % (ref 4–15)
NEUTROPHILS # BLD AUTO: 8.7 K/UL (ref 1.8–7.7)
NEUTROPHILS NFR BLD: 69.1 % (ref 38–73)
NRBC BLD-RTO: 0 /100 WBC
PLATELET # BLD AUTO: 358 K/UL (ref 150–450)
PMV BLD AUTO: 11.2 FL (ref 9.2–12.9)
POTASSIUM SERPL-SCNC: 4.3 MMOL/L (ref 3.5–5.1)
PROT SERPL-MCNC: 7.3 G/DL (ref 6–8.4)
RBC # BLD AUTO: 4.07 M/UL (ref 4–5.4)
SODIUM SERPL-SCNC: 131 MMOL/L (ref 136–145)
WBC # BLD AUTO: 12.61 K/UL (ref 3.9–12.7)

## 2022-11-28 PROCEDURE — 63600175 PHARM REV CODE 636 W HCPCS: Performed by: EMERGENCY MEDICINE

## 2022-11-28 PROCEDURE — 96368 THER/DIAG CONCURRENT INF: CPT

## 2022-11-28 PROCEDURE — 99285 EMERGENCY DEPT VISIT HI MDM: CPT | Mod: 25

## 2022-11-28 PROCEDURE — 96366 THER/PROPH/DIAG IV INF ADDON: CPT

## 2022-11-28 PROCEDURE — 25500020 PHARM REV CODE 255: Performed by: NURSE PRACTITIONER

## 2022-11-28 PROCEDURE — 81025 URINE PREGNANCY TEST: CPT | Performed by: NURSE PRACTITIONER

## 2022-11-28 PROCEDURE — 96375 TX/PRO/DX INJ NEW DRUG ADDON: CPT

## 2022-11-28 PROCEDURE — 25000003 PHARM REV CODE 250: Performed by: EMERGENCY MEDICINE

## 2022-11-28 PROCEDURE — 85025 COMPLETE CBC W/AUTO DIFF WBC: CPT | Performed by: NURSE PRACTITIONER

## 2022-11-28 PROCEDURE — 80053 COMPREHEN METABOLIC PANEL: CPT | Performed by: NURSE PRACTITIONER

## 2022-11-28 PROCEDURE — 96365 THER/PROPH/DIAG IV INF INIT: CPT | Mod: 59

## 2022-11-28 RX ORDER — MORPHINE SULFATE 2 MG/ML
2 INJECTION, SOLUTION INTRAMUSCULAR; INTRAVENOUS
Status: COMPLETED | OUTPATIENT
Start: 2022-11-28 | End: 2022-11-28

## 2022-11-28 RX ORDER — BUTALBITAL, ACETAMINOPHEN AND CAFFEINE 50; 325; 40 MG/1; MG/1; MG/1
2 TABLET ORAL 2 TIMES DAILY
COMMUNITY
Start: 2022-11-20

## 2022-11-28 RX ORDER — AMOXICILLIN AND CLAVULANATE POTASSIUM 875; 125 MG/1; MG/1
TABLET, FILM COATED ORAL
COMMUNITY
Start: 2022-11-20 | End: 2022-11-30

## 2022-11-28 RX ORDER — METHYLPREDNISOLONE 4 MG/1
TABLET ORAL
COMMUNITY
Start: 2022-11-20

## 2022-11-28 RX ADMIN — IOHEXOL 100 ML: 350 INJECTION, SOLUTION INTRAVENOUS at 12:11

## 2022-11-28 RX ADMIN — DEXTROSE 230 MG: 5 SOLUTION INTRAVENOUS at 02:11

## 2022-11-28 RX ADMIN — MORPHINE SULFATE 2 MG: 2 INJECTION, SOLUTION INTRAMUSCULAR; INTRAVENOUS at 01:11

## 2022-11-28 RX ADMIN — DOXYCYCLINE 100 MG: 100 INJECTION, POWDER, LYOPHILIZED, FOR SOLUTION INTRAVENOUS at 02:11

## 2022-11-28 NOTE — ED NOTES
Pt called nurse to room for ama papers, she states she doesn't want to wait for [facility] to have a room for her. Advised her abt tx is not complete she said she doesn't care it is making her nauseated and she had thrown up some stomach acid. I asked if she wanted to stay for some nausea medications and she said no she just wants to go home. She stated that if she felt worse she would go directly to [facility] herself.

## 2022-11-28 NOTE — ED PROVIDER NOTES
Encounter Date: 2022       History     Chief Complaint   Patient presents with    Facial Swelling     X ON /OFF X 2 MOS    Facial Pain     37-year-old female presents complaining of facial swelling patient reports swelling to the left side of the face and forehead patient reports that she was diagnosed with a large nasal polyp that had eroded into her cranial cavity and was being treated at [facility] patient reports that she was discharged a week ago and has continued on antibiotics patient reports that despite this treatment she feels like the swelling and discomfort has worsened patient complains of headache patient complains of frontal region swelling and swelling to the left maxillary area.    Review of patient's allergies indicates:  No Known Allergies  Past Medical History:   Diagnosis Date    Allergic rhinitis     Anxiety     GERD (gastroesophageal reflux disease)     Nicotine abuse     Obesity      Past Surgical History:   Procedure Laterality Date    APPENDECTOMY      Caesarean section       SECTION, LOW TRANSVERSE      x 2    EYE SURGERY Right     EYE SURGERY      LAPAROSCOPIC CHOLECYSTECTOMY N/A 10/7/2020    Procedure: CHOLECYSTECTOMY, LAPAROSCOPIC;  Surgeon: Florecita Edwards MD;  Location: [facility];  Service: General;  Laterality: N/A;    TUBAL LIGATION       No family history on file.  Social History     Tobacco Use    Smoking status: Every Day     Packs/day: 1.00     Types: Cigarettes   Substance Use Topics    Alcohol use: Yes     Comment: rarely    Drug use: No     Review of Systems   Constitutional:  Negative for fever.   HENT:  Positive for congestion and rhinorrhea. Negative for sore throat and trouble swallowing.    Eyes:  Negative for visual disturbance.   Respiratory:  Negative for cough, chest tightness, shortness of breath and wheezing.    Cardiovascular:  Negative for chest pain, palpitations and leg swelling.   Gastrointestinal:  Negative for abdominal distention,  abdominal pain, constipation, diarrhea, nausea and vomiting.   Genitourinary:  Negative for difficulty urinating, dysuria, flank pain and frequency.   Musculoskeletal:  Negative for arthralgias, back pain, joint swelling and neck pain.   Skin:  Negative for color change and rash.   Neurological:  Positive for headaches. Negative for dizziness, syncope, speech difficulty, weakness and numbness.   All other systems reviewed and are negative.    Physical Exam     Initial Vitals [11/28/22 0915]   BP Pulse Resp Temp SpO2   (!) 142/85 84 18 98.1 °F (36.7 °C) 98 %      MAP       --         Physical Exam    Nursing note and vitals reviewed.  Constitutional: She appears well-developed and well-nourished. She is not diaphoretic. No distress.   HENT:   Head: Normocephalic and atraumatic.   Right Ear: External ear normal.   Left Ear: External ear normal.   Nose: Nose normal.   Mouth/Throat: Oropharynx is clear and moist. No oropharyngeal exudate.   Patient has swelling to the left side of the nose and to the forehead this is tender to palpation   Eyes: Conjunctivae and EOM are normal. Pupils are equal, round, and reactive to light. Right eye exhibits no discharge. Left eye exhibits no discharge. No scleral icterus.   Neck: Neck supple. No thyromegaly present. No tracheal deviation present. No JVD present.   Normal range of motion.  Cardiovascular:  Normal rate, regular rhythm, normal heart sounds and intact distal pulses.     Exam reveals no gallop and no friction rub.       No murmur heard.  Pulmonary/Chest: Breath sounds normal. No stridor. No respiratory distress. She has no wheezes. She has no rhonchi. She has no rales. She exhibits no tenderness.   Abdominal: Abdomen is soft. Bowel sounds are normal. She exhibits no distension and no mass. There is no abdominal tenderness. There is no rebound and no guarding.   Musculoskeletal:         General: No tenderness or edema. Normal range of motion.      Cervical back: Normal  range of motion and neck supple.     Lymphadenopathy:     She has no cervical adenopathy.   Neurological: She is alert and oriented to person, place, and time. She has normal strength. No cranial nerve deficit or sensory deficit.   Skin: Skin is warm and dry. No rash and no abscess noted. No erythema. No pallor.       ED Course   Procedures  Labs Reviewed   CBC W/ AUTO DIFFERENTIAL - Abnormal; Notable for the following components:       Result Value    Hemoglobin 11.9 (*)     MCHC 31.2 (*)     RDW 14.7 (*)     Immature Granulocytes 0.8 (*)     Gran # (ANC) 8.7 (*)     Immature Grans (Abs) 0.10 (*)     Mono # 1.2 (*)     Lymph % 17.9 (*)     All other components within normal limits   COMPREHENSIVE METABOLIC PANEL - Abnormal; Notable for the following components:    Sodium 131 (*)     Calcium 8.3 (*)     Albumin 3.2 (*)     Anion Gap 5 (*)     All other components within normal limits   POCT URINE PREGNANCY          Imaging Results              CT Maxillofacial W WO Contrast (Final result)  Result time 11/28/22 13:38:08      Final result by Shanice Beverly MD (11/28/22 13:38:08)                   Narrative:    CMS MANDATED QUALITY DATA - CT RADIATION - 436    All CT scans at this facility utilize dose modulation, iterative reconstruction, and/or weight based dosing when appropriate to reduce radiation dose to as low as reasonably achievable.        Reason: Pansinusitis with extension into the anterior cranial fossa      TECHNIQUE: Maxillofacial CT with 100 mL Omnipaque 350 obtained with coronal and sagittal reformations.    FINDINGS:  There is complete opacification of the frontal, ethmoid, maxillary and left sphenoid sinuses. There is opacification of the nasal cavities. There is heterogeneous enhancement within the ethmoid sinuses,, left greater than right with extension into the left nasal cavity.    There is extension erosion of the cribriform plate and floor of the anterior cranial fossa as well as erosions  of the lamina papyracea bilaterally involving the medial walls of the orbits. There is abnormal enhancement along the floor of the anterior cranial fossa measuring 2.2 x 1.6 x 2.0 cm. There is dural enhancement.    There is extension into the left extraconal orbit with opacification medially. This measures 16 x 6 mm and is unchanged in size when compared to the prior study.    The globes, extraocular muscles and optic nerves are symmetric in appearance.    There is erosion through the anterior wall of the frontal sinuses medially with subcutaneous soft tissue enhancement anteriorly measuring 17 x 6 mm.    There is reversal of normal cervical lordosis. There is no additional intracranial enhancement. The pituitary region is unremarkable.    There is partial opacification of the inferior mastoid air cells. The external auditory canals are clear.      IMPRESSION: Heterogeneous opacification of the paranasal sinuses with extension into the floor of the anterior cranial fossa and left extraconal orbit which may be secondary to invasive fungal sinusitis neoplasm or less likely granulomatous with polyangiitis. This was described on recent CT and MRIs at outside facilities.    Electronically signed by:  Shanice Beverly MD  11/28/2022 1:38 PM CST Workstation: [id number]                                     CT Head W Wo Contrast (Final result)  Result time 11/28/22 13:27:15      Final result by Shanice Beverly MD (11/28/22 13:27:15)                   Narrative:    All CT scans at this facility used dose modulation, iterative reconstruction and/or weight-based dosing when appropriate to reduce radiation doses  as low as reasonably achievable.    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 There is no hemorrhage or midline shift.    There  is complete opacification of the maxillary, ethmoid, frontal and left sphenoid sinus. There is heterogeneous enhancement predominantly in the left nasal cavity, ethmoid and frontal sinuses. There is erosion of the cribriform plate with extension through the cribriform plate into the anterior cranial fossa which was described on the prior study. The enhancement along the floor of the anterior cranial fossa measures 2.1 x 2.0 cm. There is extension through the medial wall of the left orbit which was described on the prior study.    There is abnormal enhancement anterior to the right frontal bone measuring 16 x 5 mm. There is bony erosion of the anterior frontal sinus.      The globes and extraocular muscles are symmetric in size without abnormal enhancement. The dural venous sinuses are patent.    There is partial opacification of the inferior mastoid air cells.    IMPRESSION: Heterogeneous opacification of the paranasal sinuses with extension into the anterior cranial fossa and left extraconal orbit suggestive of invasive fungal sinusitis versus neoplasm    Electronically signed by:  Shanice Beverly MD  11/28/2022 1:27 PM [facility] Workstation: [id number]                                     Medications   iohexoL (OMNIPAQUE 350) injection 100 mL (100 mLs Intravenous Given 11/28/22 1236)   doxycycline (VIBRAMYCIN) 100mg in dextrose 5% 250 mL IVPB (ready to mix) (0 mg Intravenous Stopped 11/28/22 1610)   morphine injection 2 mg (2 mg Intravenous Given 11/28/22 1351)     Medical Decision Making:   History:   Old Medical Records: I decided to obtain old medical records.  Initial Assessment:   Emergent evaluation of a 37-year-old female presenting with facial swelling differential diagnosis includes mass, soft tissue injury, infection          Attending Attestation:             Attending ED Notes:   Patient's imaging shows large heterogenous opacification of the paranasal sinuses, this erodes into the anterior cranium,  patient consulted to [facility] for transfer and further management given that patient has been seeing ENT at this facility.    After patient received a dose of antibiotics in the emergency department patient decided to leave.  I was not notified of patient's departure until she had already left and signed out of the hospital against medical advice.  Nurse explained to patient that there was a risk of death disability and deterioration patient alert and oriented x3 and appears to have capacity to make her own medical decisions patient understands that she is leaving against medical advice and is free to return to the emergency department should she change her mind about evaluation and that she is encouraged to do so.  Patient is to follow up with ENT as soon as possible.                 Clinical Impression:   Final diagnoses:  [J34.89] Mass of nasal sinus (Primary)      ED Disposition Condition    AMA Stable                Aldo Moss MD  11/28/22 2001

## 2022-12-20 ENCOUNTER — HOSPITAL ENCOUNTER (EMERGENCY)
Facility: HOSPITAL | Age: 37
Discharge: HOME | End: 2022-12-20
Attending: EMERGENCY MEDICINE
Payer: MEDICAID

## 2022-12-20 VITALS
SYSTOLIC BLOOD PRESSURE: 123 MMHG | OXYGEN SATURATION: 100 % | RESPIRATION RATE: 18 BRPM | DIASTOLIC BLOOD PRESSURE: 92 MMHG | WEIGHT: 250 LBS | HEIGHT: 67 IN | HEART RATE: 105 BPM | BODY MASS INDEX: 39.24 KG/M2 | TEMPERATURE: 98 F

## 2022-12-20 DIAGNOSIS — Z78.9 PROBLEM WITH VASCULAR ACCESS: Primary | ICD-10-CM

## 2022-12-20 PROCEDURE — 25000003 PHARM REV CODE 250: Performed by: NURSE PRACTITIONER

## 2022-12-20 PROCEDURE — 99283 EMERGENCY DEPT VISIT LOW MDM: CPT

## 2022-12-20 RX ORDER — ONDANSETRON 4 MG/1
4 TABLET, ORALLY DISINTEGRATING ORAL
Status: COMPLETED | OUTPATIENT
Start: 2022-12-20 | End: 2022-12-20

## 2022-12-20 RX ORDER — HEPARIN 100 UNIT/ML
1 SYRINGE INTRAVENOUS
Status: DISCONTINUED | OUTPATIENT
Start: 2022-12-20 | End: 2022-12-20 | Stop reason: HOSPADM

## 2022-12-20 RX ORDER — HEPARIN SODIUM,PORCINE/PF 10 UNIT/ML
10 SYRINGE (ML) INTRAVENOUS
Status: DISCONTINUED | OUTPATIENT
Start: 2022-12-20 | End: 2022-12-20

## 2022-12-20 RX ADMIN — ONDANSETRON 4 MG: 4 TABLET, ORALLY DISINTEGRATING ORAL at 02:12

## 2022-12-20 NOTE — DISCHARGE INSTRUCTIONS
You were seen and evaluated in the ER today.  We have had the PICC line team come and flush your line.  Please follow-up for your antibiotics as prescribed.  Please follow-up with your PCP as needed.  Please return to the ED for any worsening symptoms such as chest pain, shortness of breath, fever not controlled with Tylenol or ibuprofen or uncontrolled pain.      Our goal in the emergency department is to always give you outstanding care and exceptional service. You may receive a survey by mail or e-mail in the next week regarding your experience in our ED. We would greatly appreciate your completing and returning the survey. Your feedback provides us with a way to recognize our staff who give very good care and it helps us learn how to improve when your experience was below our aspiration of excellence.

## 2022-12-20 NOTE — ED PROVIDER NOTES
Source of History:  Patient, chart    Chief complaint:  Vascular Access Problem (1 lumen of PICC wont flush. PICC in left upper arm. )      HPI:  Cyndy Mario is a 37 y.o. female with medical history of spinal abscess, seizures, low back pain,  presenting with PICC line complication.  Pt states she has PICC line places on 12/10 and has not been able to flush one of the ports for over a week.  Pt states she spoke to her specialists about not being able to flush the line and was advised to use other port until follow up.  Pt followed up yesterday and they were unable to flush the line or get blood.  Pt states that she was called today to come to the ED to have PICC line checked and possible replaced.  Last antibiotics today.       This is the extent to the patients complaints today here in the emergency department.    ROS: As per HPI and below:  Constitutional: No fever.  No chills.  Eyes: No visual changes.   ENT: No sore throat. No ear pain.  Urinary: No abnormal urination.  MSK: No back pain. No joint pain.   Integument: Positive for PICC line    Review of patient's allergies indicates:  No Known Allergies    PMH:  As per HPI and below:  Past Medical History:   Diagnosis Date    Allergic rhinitis     Anxiety     GERD (gastroesophageal reflux disease)     Nicotine abuse     Obesity      Past Surgical History:   Procedure Laterality Date    APPENDECTOMY      Caesarean section       SECTION, LOW TRANSVERSE      x 2    EYE SURGERY Right     EYE SURGERY      LAPAROSCOPIC CHOLECYSTECTOMY N/A 10/7/2020    Procedure: CHOLECYSTECTOMY, LAPAROSCOPIC;  Surgeon: Florecita Edwards MD;  Location: [facility] OR;  Service: General;  Laterality: N/A;    TUBAL LIGATION         Social History     Tobacco Use    Smoking status: Every Day     Packs/day: 1.00     Types: Cigarettes   Substance Use Topics    Alcohol use: Yes     Comment: rarely    Drug use: No       Physical Exam:    BP (!) 123/92 (BP Location: Right arm, Patient  "Position: Sitting)   Pulse 105   Temp 97.5 °F (36.4 °C) (Oral)   Resp 18   Ht 5' 7" (1.702 m)   Wt 113.4 kg (250 lb)   SpO2 100%   BMI 39.16 kg/m²   Nursing note and vital signs reviewed.  Constitutional: No acute distress.  Nontoxic  Eyes: No conjunctival injection. Extraocular muscles intact.  ENT: Normal phonation.  Musculoskeletal: Good range of motion all joints.  No deformities.  Neck supple.  No meningismus. Neurovascularly intact.  Skin:  PICC line noted to left upper extremity.  No redness, erythema or drainage at insertion site.  One 4 able to be flushed.  Second poor unable to/or get blood.  Patient denies any pain to palpation 2 PICC line insertion site.  No rashes seen.  Good turgor.  No abrasions.  No ecchymoses.  Psych:  Alert and oriented x 3.  Appropriate, conversant.    I decided to obtain the patient's medical records.  Summary of Medical Records:  PICC line placed on 12/10.  Unable to flush purple port for the past week.  No difficulty with white port    MDM/ Differential Dx:   Emergent evaluation of a 38 yo female presenting for PICC line complication.  Patient states 1 of the ports in her PICC line is not flushing.  Pt denies any pain, drainage or other complications.  On exam pt is A&Ox3. VSS. Nonfebrile and nontoxic appearing.  PICC line noted to left upper extremity.  No redness, erythema or drainage at insertion site.  One 4 able to be flushed.  Second poor unable to/or get blood.  Patient denies any pain to palpation 2 PICC line insertion site.  Pt speaking in full sentences.  Steady gait appreciated. Cap refill < 3 seconds.      Differential diagnoses include but are not limited to PICC line complication, PICC line malfunction, others.      We will attempt to flush line, consult PICC line and reassess.  I discussed this case with my supervising physician.    ED Course as of 12/20/22 1509   Tue Dec 20, 2022   1507 PICC team nurse, Nico at bedside.  PICC line reposition and able " to draw blood and flush with no complication.  Patient is stable for discharge home.  Patient advised to get her antibiotics as prescribed.  Follow-up as scheduled.  Strict return to ED precautions discussed.  Patient verbalized understanding of this plan of care.  All questions and concerns addressed. [RZ]   1508 Patient is hemodynamically stable, vital signs are normal. Discharge instructions given. Return to ED precautions discussed. Follow up as directed. Pt verbalized understanding of this plan.  Pt is stable for discharge.  [RZ]      ED Course User Index  [RZ] Miya Dixon NP               Diagnostic Impression:    1. Problem with vascular access         ED Disposition Condition    Discharge Stable            ED Prescriptions    None       Follow-up Information       Follow up With Specialties Details Why Contact Info    PCP  Schedule an appointment as soon as possible for a visit  As needed                Miya Dixon NP  12/20/22 4428

## 2023-10-11 DIAGNOSIS — C79.31: Primary | ICD-10-CM

## 2023-10-11 DIAGNOSIS — J32.4 CHRONIC PANSINUSITIS: ICD-10-CM

## 2023-10-11 DIAGNOSIS — C31.1 PRIMARY SQUAMOUS CELL CARCINOMA OF ETHMOIDAL SINUS: ICD-10-CM

## 2023-10-11 DIAGNOSIS — C31.9: Primary | ICD-10-CM

## 2023-10-11 DIAGNOSIS — J31.0 CHRONIC RHINITIS: ICD-10-CM

## 2023-10-14 ENCOUNTER — HOSPITAL ENCOUNTER (OUTPATIENT)
Dept: RADIOLOGY | Facility: HOSPITAL | Age: 38
Discharge: HOME | End: 2023-10-14
Attending: OTOLARYNGOLOGY
Payer: MEDICAID

## 2023-10-14 DIAGNOSIS — J31.0 CHRONIC RHINITIS: ICD-10-CM

## 2023-10-14 DIAGNOSIS — C31.9: ICD-10-CM

## 2023-10-14 DIAGNOSIS — C79.31: ICD-10-CM

## 2023-10-14 DIAGNOSIS — C31.1 PRIMARY SQUAMOUS CELL CARCINOMA OF ETHMOIDAL SINUS: ICD-10-CM

## 2023-10-14 DIAGNOSIS — J32.4 CHRONIC PANSINUSITIS: ICD-10-CM

## 2023-10-14 PROCEDURE — 70553 MRI BRAIN STEM W/O & W/DYE: CPT | Mod: TC

## 2023-10-14 PROCEDURE — 25500020 PHARM REV CODE 255

## 2023-10-14 PROCEDURE — A9585 GADOBUTROL INJECTION: HCPCS

## 2023-10-14 RX ORDER — GADOBUTROL 604.72 MG/ML
7 INJECTION INTRAVENOUS
Status: COMPLETED | OUTPATIENT
Start: 2023-10-14 | End: 2023-10-14

## 2023-10-14 RX ADMIN — GADOBUTROL 7 ML: 604.72 INJECTION INTRAVENOUS at 12:10

## 2025-05-08 NOTE — PROGRESS NOTES
Subjective:       Patient ID: Cyndy Mario is a 35 y.o. female.    Chief Complaint: Other (FU DOS 10/7/20, Lap Cara)      HPI:  Cyndy Mario is here for post-op. Patient has no systemic complaints. Post operative   pain is under control.  Tolerating diet, no nausea/vomiting.  Having normal bowel movements.        Objective:      Physical Exam  Constitutional:       General: She is not in acute distress.  Abdominal:      General: There is no distension.      Palpations: Abdomen is soft.      Tenderness: There is no abdominal tenderness. There is no guarding or rebound.   Skin:     Comments: Incisions are clean, dry and intact  There is no evidence of infection, hematoma or seroma    Neurological:      Mental Status: She is oriented to person, place, and time.   Psychiatric:         Behavior: Behavior is cooperative.         Assessment/Plan:   Chronic cholecystitis      Path - ok    Follow up for F/U - As needed.     Home

## (undated) DEVICE — Device

## (undated) DEVICE — SLEEVE TROCAR 5MMX100MM  CTS02

## (undated) DEVICE — TRAY GENERAL LAPAROSCOPY

## (undated) DEVICE — TROCAR OPTICAL ZTHREAD 5MMX100MM CTF03

## (undated) DEVICE — CABLE MONOPOLAR 10FT DISPOSABLE

## (undated) DEVICE — SCISSORS 5MM APPLIED MEDICAL   CB030

## (undated) DEVICE — TROCAR OPTICAL ZTHREAD 12MMX100MM CTF73

## (undated) DEVICE — SUCTION/IRRIGATOR W/TIP

## (undated) DEVICE — UNDERGLOVE BIOGEL PI MICRO BLUE SZ 6.5

## (undated) DEVICE — RETRIEVER ENDOPOUCH SPEC BAG

## (undated) DEVICE — NEEDLE INSUFFLATION 120MM 172015

## (undated) DEVICE — SUTURE MONOCRYL 4-0 PS-2 27 MCP426H

## (undated) DEVICE — GLOVE BIOGEL MICRO SURGEON PINK SZ 6.5

## (undated) DEVICE — SOLUTION IRRI H2O BOTTLE 1000ML

## (undated) DEVICE — SWABSTICK BENZOIN S42450

## (undated) DEVICE — SUTURE VICRYL #0 27 UR-6 VCP603H

## (undated) DEVICE — APPLIER CLIP  5MM

## (undated) DEVICE — STERISTRIP 1/2 R1547

## (undated) DEVICE — DRESSING MEPORE 2.5 X 3   670800

## (undated) DEVICE — PAD BOVIE ADULT

## (undated) DEVICE — SOLUTION IRRI NS BOTTLE 1000ML R5200-01